# Patient Record
Sex: FEMALE | Race: WHITE | NOT HISPANIC OR LATINO | Employment: OTHER | URBAN - METROPOLITAN AREA
[De-identification: names, ages, dates, MRNs, and addresses within clinical notes are randomized per-mention and may not be internally consistent; named-entity substitution may affect disease eponyms.]

---

## 2017-02-01 ENCOUNTER — TRANSCRIBE ORDERS (OUTPATIENT)
Dept: ADMINISTRATIVE | Facility: HOSPITAL | Age: 47
End: 2017-02-01

## 2017-02-01 DIAGNOSIS — Z12.31 OTHER SCREENING MAMMOGRAM: Primary | ICD-10-CM

## 2017-03-01 ENCOUNTER — HOSPITAL ENCOUNTER (OUTPATIENT)
Dept: MAMMOGRAPHY | Facility: MEDICAL CENTER | Age: 47
Discharge: HOME | End: 2017-03-01
Payer: COMMERCIAL

## 2017-03-01 DIAGNOSIS — Z12.31 ENCOUNTER FOR SCREENING MAMMOGRAM FOR MALIGNANT NEOPLASM OF BREAST: ICD-10-CM

## 2017-03-01 PROCEDURE — G0202 SCR MAMMO BI INCL CAD: HCPCS

## 2017-04-21 ENCOUNTER — ALLSCRIPTS OFFICE VISIT (OUTPATIENT)
Dept: OTHER | Facility: OTHER | Age: 47
End: 2017-04-21

## 2017-04-21 DIAGNOSIS — N92.0 EXCESSIVE AND FREQUENT MENSTRUATION WITH REGULAR CYCLE: ICD-10-CM

## 2018-01-12 NOTE — RESULT NOTES
Verified Results  HOLTER MONITOR - 24 HOUR [id number] 11:20AM Archana Ramírez Order Number: [id number]     Test Name Result Flag Reference   HOLTER MONITOR - 24 HOUR (Report)     Indication: Palpitations     Patient was monitored for a total of 24 hours from 11:15 AM on 3/17/2016  A total of [id number] beats were monitored  The image quality was Good  The predominant rhythm was Normal sinus rhythm  Average heart rate was 81 bpm  Minimum heart rate was 57 bpm which was Sinus bradycardia at 4:42 PM  Maximum heart rate was 145 bpm which was Sinus tachycardia at 8:40 AM       There was a total of 4 minutes of bradycardia which was Sinus bradycardia  Ventricular ectopy consisted of 149 beats, 0 1 % of the total monitored beats  This consisted of Isolated PVCs  Supraventricular ectopy consisted of 11 beats, Less than 0 1 % of the total monitored beats  This consisted of Isolated PACs  There was no Atrial fibrillation during the monitored period  There were no other arrhythmias other than as described above  A symptom diary Was returned for correlation With no symptoms reported  Impression:     Overall Normal 24 hrs of holter monitoring  Predominant rhythm was Normal sinus rhythm  Normal diurnal variation of heart rate   Low-density of supraventricular ectopy during the monitored period  Low-density of ventricular ectopy during the monitored period      No symptoms were reported in the symptom diary

## 2018-01-15 VITALS
SYSTOLIC BLOOD PRESSURE: 126 MMHG | WEIGHT: 293 LBS | HEIGHT: 68 IN | BODY MASS INDEX: 44.41 KG/M2 | DIASTOLIC BLOOD PRESSURE: 78 MMHG

## 2018-01-17 NOTE — RESULT NOTES
Verified Results  * MAMMO SCREENING BILATERAL W CAD [id number] 12:33PM Kory De Leon     Test Name Result Flag Reference   MAMMO SCREENING BILATERAL W CAD (Report)     Patient History:   Patient had first child at age 43  Family history of unknown cancer in father and unknown cancer in    maternal grandmother  Patient has never smoked  Patient's BMI is 43 0  Reason for exam: screening (asymptomatic)  Mammo Screening Bilateral W CAD: February 6, 2016 - Check In #:    [de-identified]   Bilateral CC and MLO view(s) were taken  Technologist: MARICRUZ Shaffer (R)(M)   Prior study comparison: December 29, 2014, bilateral digital    screening mammogram performed at [place] [address]  January 16, 2014, right breast unilateral    diagnostic mammogram, performed at [address] [place] [address]  December 27, 2013, bilateral digital screening mammogram   performed at [place] [address]  The breast tissue is almost entirely fat  Bilateral digital mammography was performed  No dominant soft    tissue mass, architectural distortion or suspicious    calcifications are noted in either breast  The skin and nipple    contours are within normal limits  No significant changes when compared with prior studies  ASSESSMENT: BiRad:1 - Negative     Recommendation:   Routine screening mammogram of both breasts in 1 year  A    reminder letter will be scheduled  Analyzed by CAD     8-10% of cancers will be missed on mammography  Management of a    palpable abnormality must be based on clinical grounds  Patients   will be notified of their results via letter from our facility  Accredited by [employer] of [employer] and FDA       Transcription Location: MARICRUZ Calderon 98: [id number]     Risk Value(s):   Tyrer-Cuzick 10 Year: 2 857%, Tyrer-Cuzick Lifetime: 15 447%,    Myriad Table: 1 5%, LOUIE 5 Year: 1 1%, NCI Lifetime: 13 0%

## 2018-03-02 DIAGNOSIS — Z12.31 ENCOUNTER FOR SCREENING MAMMOGRAM FOR MALIGNANT NEOPLASM OF BREAST: ICD-10-CM

## 2018-03-09 ENCOUNTER — HOSPITAL ENCOUNTER (OUTPATIENT)
Dept: MAMMOGRAPHY | Facility: HOSPITAL | Age: 48
Discharge: HOME | End: 2018-03-09
Payer: COMMERCIAL

## 2018-03-09 DIAGNOSIS — Z12.31 ENCOUNTER FOR SCREENING MAMMOGRAM FOR MALIGNANT NEOPLASM OF BREAST: ICD-10-CM

## 2018-03-09 PROCEDURE — 77067 SCR MAMMO BI INCL CAD: CPT

## 2018-06-12 PROBLEM — D64.9 ANEMIA: Status: ACTIVE | Noted: 2018-06-12

## 2018-06-12 PROBLEM — E66.01 MORBID OBESITY (HCC): Status: ACTIVE | Noted: 2018-06-12

## 2018-06-12 PROBLEM — E53.8 VITAMIN B 12 DEFICIENCY: Status: ACTIVE | Noted: 2018-06-12

## 2018-07-18 ENCOUNTER — ANNUAL EXAM (OUTPATIENT)
Dept: OBGYN CLINIC | Facility: CLINIC | Age: 48
End: 2018-07-18
Payer: COMMERCIAL

## 2018-07-18 VITALS
DIASTOLIC BLOOD PRESSURE: 78 MMHG | SYSTOLIC BLOOD PRESSURE: 122 MMHG | BODY MASS INDEX: 43.4 KG/M2 | WEIGHT: 293 LBS | HEIGHT: 69 IN

## 2018-07-18 DIAGNOSIS — Z12.31 ENCOUNTER FOR SCREENING MAMMOGRAM FOR MALIGNANT NEOPLASM OF BREAST: ICD-10-CM

## 2018-07-18 DIAGNOSIS — Z01.419 ENCNTR FOR GYN EXAM (GENERAL) (ROUTINE) W/O ABN FINDINGS: ICD-10-CM

## 2018-07-18 PROCEDURE — S0612 ANNUAL GYNECOLOGICAL EXAMINA: HCPCS | Performed by: PHYSICIAN ASSISTANT

## 2018-07-18 RX ORDER — DOXYCYCLINE HYCLATE 50 MG/1
324 CAPSULE, GELATIN COATED ORAL
COMMUNITY

## 2018-07-18 NOTE — PROGRESS NOTES
Lillian Noe  1970      CC:  Yearly exam    S:  52 y o  female here for yearly exam  Her cycles are spacing out, coming occasionally every 3 weeks but mostly every 6-8 weeks  They are sometimes just a day of light bleeding and sometimes are a regular period; her regular periods can be heavy and she uses ibuprofen with good results for the heaviness  She is aware to call with any heavier bleeding or if her periods are coming closer together  She is sexually active  She uses condoms for contraception  She continues to work as an  in [employer] and is happy in her practice  She has started some very rare, intermittent hot flashes  She will call if this worsens or becomes more problematic  Last Pap 3/9/16 neg/neg  Last Mammo 3/9/18 neg      Current Outpatient Prescriptions:     cyanocobalamin 1,000 mcg/mL, Inject 1,000 mcg into the shoulder, thigh, or buttocks every 30 (thirty) days, Disp: , Rfl:     diphenhydrAMINE-acetaminophen (TYLENOL PM)  MG TABS, Take 1 tablet by mouth daily at bedtime as needed for sleep, Disp: , Rfl:     ergocalciferol (VITAMIN D2) 50,000 units, Take 50,000 Units by mouth once a week, Disp: , Rfl:     ferrous gluconate (FERGON) 324 mg tablet, Take 324 mg by mouth daily with breakfast, Disp: , Rfl:   Social History     Social History    Marital status: /Civil Union     Spouse name: N/A    Number of children: N/A    Years of education: N/A     Occupational History    Not on file       Social History Main Topics    Smoking status: Never Smoker    Smokeless tobacco: Never Used    Alcohol use No    Drug use: No    Sexual activity: Yes     Partners: Male     Other Topics Concern    Not on file     Social History Narrative    No narrative on file     Family History   Problem Relation Age of Onset    Hypertension Mother     Fibroids Mother     Leukemia Father       Past Medical History:   Diagnosis Date    Anemia     Morbid obesity ([facility] [address] )     Vitamin B 12 deficiency         O:  Blood pressure 122/78, height 5' 9" (1 753 m), weight (!) 151 kg (332 lb), last menstrual period 06/18/2018  Patient appears well and is not in distress  Neck is supple without masses  Breasts are symmetrical without mass, tenderness, nipple discharge, skin changes or adenopathy  Abdomen is soft and nontender without masses  External genitals are normal without lesions or rashes  Vagina is normal without discharge or bleeding  Cervix is normal without discharge or lesion  Uterus is normal, mobile, nontender without palpable mass  Adnexa are normal, nontender, without palpable mass  A:  Yearly exam      P:   Pap due 2021   Mammo slip provided    RTO one year for yearly exam or sooner as needed

## 2018-09-10 ENCOUNTER — TELEPHONE (OUTPATIENT)
Dept: OBGYN CLINIC | Facility: CLINIC | Age: 48
End: 2018-09-10

## 2018-09-10 NOTE — TELEPHONE ENCOUNTER
Patient had spoken to you previously about her periods and stated that if she continues to have heavy periods to give us a call and you could try some different things  Patient stated period is currently very heavy and it kept her awake last night

## 2018-09-11 NOTE — TELEPHONE ENCOUNTER
If she is having heavier bleeding that ibuprofen is not controlling, she needs a workup to see why her bleeding is so heavy  At this point I have no way of proving that there is nothing wrong causing her heavy bleeding  I am okay with not repeating labs - I didn't see that she already had them - but we need to be sure her uterus is structurally normal before proceeding with treatment (by checking the ultrasound)  If she refuses, she can come in and I can discuss this further with her - she will also need an endometrial biopsy at that time

## 2018-09-11 NOTE — TELEPHONE ENCOUNTER
Spoke with pt - she stated she had those b/w done for her PCP last month and will have those results sent to us - does not want to go for them again  As for the u/s - she refuses to do it since she has a high deductible  She stated she was under the impression from her last appointment that she would be given a medication if this happened and would like an RX  Please advise  Thanks!

## 2018-09-11 NOTE — TELEPHONE ENCOUNTER
I spoke with patient  "if she would have known we were going to order invasive, expensive tests she would have not called our office " She said that an [place] would be financially excessive to do " I did explain that we need to evaluate why she is having heavy bleeding  She is a self employed  and has a $9,000 deductible  She thought we would just give her  medicine to stop the bleeding  I again explained that we need to evaluate further by US or office vist with EB  She is not interested in doing this at this time and said "she will just deal with it " I told her I would really this message to you

## 2019-03-21 ENCOUNTER — HOSPITAL ENCOUNTER (OUTPATIENT)
Dept: MAMMOGRAPHY | Facility: HOSPITAL | Age: 49
Discharge: HOME | End: 2019-03-21
Payer: COMMERCIAL

## 2019-03-21 VITALS — WEIGHT: 293 LBS | BODY MASS INDEX: 43.4 KG/M2 | HEIGHT: 69 IN

## 2019-03-21 DIAGNOSIS — Z12.31 ENCOUNTER FOR SCREENING MAMMOGRAM FOR MALIGNANT NEOPLASM OF BREAST: ICD-10-CM

## 2019-03-21 PROCEDURE — 77067 SCR MAMMO BI INCL CAD: CPT

## 2019-09-24 ENCOUNTER — ANNUAL EXAM (OUTPATIENT)
Dept: OBGYN CLINIC | Facility: CLINIC | Age: 49
End: 2019-09-24
Payer: COMMERCIAL

## 2019-09-24 VITALS
WEIGHT: 293 LBS | DIASTOLIC BLOOD PRESSURE: 80 MMHG | BODY MASS INDEX: 44.41 KG/M2 | SYSTOLIC BLOOD PRESSURE: 126 MMHG | HEIGHT: 68 IN

## 2019-09-24 DIAGNOSIS — Z01.419 ENCOUNTER FOR GYNECOLOGICAL EXAMINATION WITHOUT ABNORMAL FINDING: Primary | ICD-10-CM

## 2019-09-24 DIAGNOSIS — Z12.31 ENCOUNTER FOR SCREENING MAMMOGRAM FOR MALIGNANT NEOPLASM OF BREAST: ICD-10-CM

## 2019-09-24 PROBLEM — D50.9 IRON DEFICIENCY ANEMIA: Status: ACTIVE | Noted: 2017-05-31

## 2019-09-24 PROCEDURE — S0612 ANNUAL GYNECOLOGICAL EXAMINA: HCPCS | Performed by: PHYSICIAN ASSISTANT

## 2019-09-24 NOTE — PROGRESS NOTES
Bronwyn Lubin  1970      CC:  Yearly exam    S:  50 y o  female here for yearly exam  Her cycles are spacing out and she has currently gone 6 months without a period  She does have some hot flashes and night sweats  She is in the process of adopting a 11year old son from [facility]  Sexual activity: She is sexually active without pain, bleeding or dryness  Contraception: She uses condoms for contraception  Last Pap 3/9/16 neg/neg  Last Mammo 3/21/19 neg  Last colonoscopy 6/2019    We reviewed [facility] guidelines for Pap testing today       Family hx of breast cancer: no  Family hx of ovarian cancer: no  Family hx of colon cancer: no    Current Outpatient Medications:     cyanocobalamin 1,000 mcg/mL, Inject 1,000 mcg into the shoulder, thigh, or buttocks every 30 (thirty) days, Disp: , Rfl:     diphenhydrAMINE-acetaminophen (TYLENOL PM)  MG TABS, Take 1 tablet by mouth daily at bedtime as needed for sleep, Disp: , Rfl:     ergocalciferol (VITAMIN D2) 50,000 units, Take 50,000 Units by mouth once a week, Disp: , Rfl:     ferrous gluconate (FERGON) 324 mg tablet, Take 324 mg by mouth daily with breakfast, Disp: , Rfl:   Social History     Socioeconomic History    Marital status: /Civil Union     Spouse name: Not on file    Number of children: Not on file    Years of education: Not on file    Highest education level: Not on file   Occupational History    Not on file   Social Needs    Financial resource strain: Not on file    Food insecurity:     Worry: Not on file     Inability: Not on file    Transportation needs:     Medical: Not on file     Non-medical: Not on file   Tobacco Use    Smoking status: Never Smoker    Smokeless tobacco: Never Used   Substance and Sexual Activity    Alcohol use: No    Drug use: No    Sexual activity: Yes     Partners: Male   Lifestyle    Physical activity:     Days per week: Not on file     Minutes per session: Not on file    Stress: Not on file   Relationships    Social connections:     Talks on phone: Not on file     Gets together: Not on file     Attends Anabaptist service: Not on file     Active member of club or organization: Not on file     Attends meetings of clubs or organizations: Not on file     Relationship status: Not on file    Intimate partner violence:     Fear of current or ex partner: Not on file     Emotionally abused: Not on file     Physically abused: Not on file     Forced sexual activity: Not on file   Other Topics Concern    Not on file   Social History Narrative    Not on file     Family History   Problem Relation Age of Onset    Hypertension Mother    Romulo Macias Fibroids Mother     Leukemia Father     Thyroid cancer Maternal Grandmother       Past Medical History:   Diagnosis Date    Anemia     Morbid obesity ([facility] [address] [facility]     Vitamin B 12 deficiency         Review of Systems   Respiratory: Negative  Cardiovascular: Negative  Gastrointestinal: Negative for constipation and diarrhea  Genitourinary: Negative for difficulty urinating, pelvic pain, vaginal bleeding, vaginal discharge, itching or odor  O:  Blood pressure 126/80, height 5' 7 72" (1 72 m), weight (!) 166 kg (367 lb)  Patient appears well and is not in distress  Neck is supple without masses  Breasts are symmetrical without mass, tenderness, nipple discharge, skin changes or adenopathy  Abdomen is soft and nontender without masses  External genitals are normal without lesions or rashes  Urethral meatus and urethra are normal  Bladder is normal to palpation  Vagina is normal without discharge or bleeding  Cervix is normal without discharge or lesion  Uterus is normal, mobile, nontender without palpable mass  Adnexa are normal, nontender, without palpable mass  A:  Yearly exam      P:   Pap 2021   Mammo slip provided    RTO one year for yearly exam or sooner as needed

## 2019-11-25 NOTE — PRE-PROCEDURE INSTRUCTIONS
Pre-Surgery Instructions:   Medication Instructions    CALCIUM PO Instructed patient per Anesthesia Guidelines   cyanocobalamin 1,000 mcg/mL Instructed patient per Anesthesia Guidelines   ergocalciferol (VITAMIN D2) 50,000 units Instructed patient per Anesthesia Guidelines   ferrous gluconate (FERGON) 324 mg tablet Instructed patient per Anesthesia Guidelines  Pt believes md ordered oral antibiotic for DOS- if so will take DOS with sip ofw ater per md instructions

## 2019-11-26 ENCOUNTER — ANESTHESIA EVENT (OUTPATIENT)
Dept: PERIOP | Facility: HOSPITAL | Age: 49
End: 2019-11-26
Payer: COMMERCIAL

## 2019-11-27 ENCOUNTER — ANESTHESIA (OUTPATIENT)
Dept: PERIOP | Facility: HOSPITAL | Age: 49
End: 2019-11-27
Payer: COMMERCIAL

## 2019-11-27 ENCOUNTER — HOSPITAL ENCOUNTER (OUTPATIENT)
Facility: HOSPITAL | Age: 49
Setting detail: OUTPATIENT SURGERY
Discharge: HOME | End: 2019-11-27
Attending: SURGERY | Admitting: SURGERY
Payer: COMMERCIAL

## 2019-11-27 VITALS
TEMPERATURE: 99.7 F | HEIGHT: 70 IN | RESPIRATION RATE: 22 BRPM | SYSTOLIC BLOOD PRESSURE: 110 MMHG | DIASTOLIC BLOOD PRESSURE: 63 MMHG | WEIGHT: 293 LBS | OXYGEN SATURATION: 97 % | HEART RATE: 58 BPM | BODY MASS INDEX: 41.95 KG/M2

## 2019-11-27 DIAGNOSIS — C43.39 MALIGNANT MELANOMA OF OTHER PARTS OF FACE (HCC): ICD-10-CM

## 2019-11-27 PROBLEM — D03.39 MELANOMA IN SITU OF CHEEK (HCC): Status: ACTIVE | Noted: 2019-11-27

## 2019-11-27 LAB
EXT PREGNANCY TEST URINE: NEGATIVE
EXT. CONTROL: NORMAL

## 2019-11-27 PROCEDURE — 88305 TISSUE EXAM BY PATHOLOGIST: CPT | Performed by: PATHOLOGY

## 2019-11-27 PROCEDURE — 81025 URINE PREGNANCY TEST: CPT | Performed by: ANESTHESIOLOGY

## 2019-11-27 RX ORDER — FENTANYL CITRATE 50 UG/ML
INJECTION, SOLUTION INTRAMUSCULAR; INTRAVENOUS AS NEEDED
Status: DISCONTINUED | OUTPATIENT
Start: 2019-11-27 | End: 2019-11-27 | Stop reason: SURG

## 2019-11-27 RX ORDER — DEXAMETHASONE SODIUM PHOSPHATE 4 MG/ML
INJECTION, SOLUTION INTRA-ARTICULAR; INTRALESIONAL; INTRAMUSCULAR; INTRAVENOUS; SOFT TISSUE AS NEEDED
Status: DISCONTINUED | OUTPATIENT
Start: 2019-11-27 | End: 2019-11-27 | Stop reason: SURG

## 2019-11-27 RX ORDER — MIDAZOLAM HYDROCHLORIDE 2 MG/2ML
INJECTION, SOLUTION INTRAMUSCULAR; INTRAVENOUS AS NEEDED
Status: DISCONTINUED | OUTPATIENT
Start: 2019-11-27 | End: 2019-11-27 | Stop reason: SURG

## 2019-11-27 RX ORDER — PROPOFOL 10 MG/ML
INJECTION, EMULSION INTRAVENOUS AS NEEDED
Status: DISCONTINUED | OUTPATIENT
Start: 2019-11-27 | End: 2019-11-27 | Stop reason: SURG

## 2019-11-27 RX ORDER — CEFAZOLIN SODIUM 1 G/50ML
1000 SOLUTION INTRAVENOUS ONCE
Status: DISCONTINUED | OUTPATIENT
Start: 2019-11-27 | End: 2019-11-27

## 2019-11-27 RX ORDER — NEOSTIGMINE METHYLSULFATE 1 MG/ML
INJECTION INTRAVENOUS AS NEEDED
Status: DISCONTINUED | OUTPATIENT
Start: 2019-11-27 | End: 2019-11-27 | Stop reason: SURG

## 2019-11-27 RX ORDER — LIDOCAINE HYDROCHLORIDE AND EPINEPHRINE 10; 10 MG/ML; UG/ML
INJECTION, SOLUTION INFILTRATION; PERINEURAL AS NEEDED
Status: DISCONTINUED | OUTPATIENT
Start: 2019-11-27 | End: 2019-11-27 | Stop reason: HOSPADM

## 2019-11-27 RX ORDER — GLYCOPYRROLATE 0.2 MG/ML
INJECTION INTRAMUSCULAR; INTRAVENOUS AS NEEDED
Status: DISCONTINUED | OUTPATIENT
Start: 2019-11-27 | End: 2019-11-27 | Stop reason: SURG

## 2019-11-27 RX ORDER — MEPERIDINE HYDROCHLORIDE 50 MG/ML
12.5 INJECTION INTRAMUSCULAR; INTRAVENOUS; SUBCUTANEOUS ONCE AS NEEDED
Status: DISCONTINUED | OUTPATIENT
Start: 2019-11-27 | End: 2019-11-27 | Stop reason: HOSPADM

## 2019-11-27 RX ORDER — GINSENG 100 MG
CAPSULE ORAL AS NEEDED
Status: DISCONTINUED | OUTPATIENT
Start: 2019-11-27 | End: 2019-11-27 | Stop reason: HOSPADM

## 2019-11-27 RX ORDER — HYDROCODONE BITARTRATE AND ACETAMINOPHEN 5; 325 MG/1; MG/1
1 TABLET ORAL EVERY 4 HOURS PRN
Status: DISCONTINUED | OUTPATIENT
Start: 2019-11-27 | End: 2019-11-27 | Stop reason: HOSPADM

## 2019-11-27 RX ORDER — HYDROMORPHONE HCL/PF 1 MG/ML
0.5 SYRINGE (ML) INJECTION
Status: DISCONTINUED | OUTPATIENT
Start: 2019-11-27 | End: 2019-11-27 | Stop reason: HOSPADM

## 2019-11-27 RX ORDER — ONDANSETRON 2 MG/ML
INJECTION INTRAMUSCULAR; INTRAVENOUS AS NEEDED
Status: DISCONTINUED | OUTPATIENT
Start: 2019-11-27 | End: 2019-11-27 | Stop reason: SURG

## 2019-11-27 RX ORDER — ROCURONIUM BROMIDE 10 MG/ML
INJECTION, SOLUTION INTRAVENOUS AS NEEDED
Status: DISCONTINUED | OUTPATIENT
Start: 2019-11-27 | End: 2019-11-27 | Stop reason: SURG

## 2019-11-27 RX ORDER — SODIUM CHLORIDE 9 MG/ML
125 INJECTION, SOLUTION INTRAVENOUS CONTINUOUS
Status: DISCONTINUED | OUTPATIENT
Start: 2019-11-27 | End: 2019-11-27 | Stop reason: HOSPADM

## 2019-11-27 RX ORDER — ONDANSETRON 2 MG/ML
4 INJECTION INTRAMUSCULAR; INTRAVENOUS ONCE AS NEEDED
Status: DISCONTINUED | OUTPATIENT
Start: 2019-11-27 | End: 2019-11-27 | Stop reason: HOSPADM

## 2019-11-27 RX ORDER — FENTANYL CITRATE/PF 50 MCG/ML
50 SYRINGE (ML) INJECTION
Status: DISCONTINUED | OUTPATIENT
Start: 2019-11-27 | End: 2019-11-27 | Stop reason: HOSPADM

## 2019-11-27 RX ADMIN — HYDROCODONE BITARTRATE AND ACETAMINOPHEN 1 TABLET: 5; 325 TABLET ORAL at 11:13

## 2019-11-27 RX ADMIN — NEOSTIGMINE METHYLSULFATE 3 MG: 1 INJECTION, SOLUTION INTRAVENOUS at 09:24

## 2019-11-27 RX ADMIN — PROPOFOL 200 MG: 10 INJECTION, EMULSION INTRAVENOUS at 08:42

## 2019-11-27 RX ADMIN — LIDOCAINE HYDROCHLORIDE 100 MG: 20 INJECTION, SOLUTION INTRAVENOUS at 09:20

## 2019-11-27 RX ADMIN — Medication 3000 MG: at 08:20

## 2019-11-27 RX ADMIN — SODIUM CHLORIDE: 0.9 INJECTION, SOLUTION INTRAVENOUS at 09:19

## 2019-11-27 RX ADMIN — GLYCOPYRROLATE 0.5 MG: 0.2 INJECTION INTRAMUSCULAR; INTRAVENOUS at 09:24

## 2019-11-27 RX ADMIN — FENTANYL CITRATE 100 MCG: 50 INJECTION, SOLUTION INTRAMUSCULAR; INTRAVENOUS at 08:40

## 2019-11-27 RX ADMIN — ONDANSETRON 4 MG: 2 INJECTION INTRAMUSCULAR; INTRAVENOUS at 09:00

## 2019-11-27 RX ADMIN — LIDOCAINE HYDROCHLORIDE 100 MG: 20 INJECTION, SOLUTION INTRAVENOUS at 08:42

## 2019-11-27 RX ADMIN — DEXAMETHASONE SODIUM PHOSPHATE 6 MG: 4 INJECTION, SOLUTION INTRAMUSCULAR; INTRAVENOUS at 09:00

## 2019-11-27 RX ADMIN — MIDAZOLAM 2 MG: 1 INJECTION INTRAMUSCULAR; INTRAVENOUS at 08:36

## 2019-11-27 RX ADMIN — ROCURONIUM BROMIDE 30 MG: 50 INJECTION, SOLUTION INTRAVENOUS at 08:42

## 2019-11-27 RX ADMIN — PROPOFOL 100 MG: 10 INJECTION, EMULSION INTRAVENOUS at 09:20

## 2019-11-27 RX ADMIN — SODIUM CHLORIDE 125 ML/HR: 0.9 INJECTION, SOLUTION INTRAVENOUS at 08:10

## 2019-11-27 RX ADMIN — PROPOFOL 30 MG: 10 INJECTION, EMULSION INTRAVENOUS at 09:03

## 2019-11-27 NOTE — DISCHARGE INSTRUCTIONS
[facility]  Postoperative Instructions for Outpatient Surgery  [facility], [address] [place] [address], [address] [place] [address] Rd, [place], [address] [facility] Keisha / Сергей Roche  / www [facility]      These  instructions are being provided by you doctor to give you basic guidelines during you post-op recovery  Please let our office know your contact information has changed  Please call the office today to schedule a post operative appointment, and tell the office staff  that you doctor needs see you in our office in 7-10 days  Dressing:          No dressing required    Apply ice as needed       Bathing      Showering permitted          Apply Bacitracin, Neosporin, other antibiotic ointment  To incision 4-5 x/day     Medication    Resume preparative medication       Motrin or Tylenol is OK    Other Instruction:  Wash area daily      Activities  No bending , lifting, or straining        Bruising, and welling I expected  incision and surrounding area  It is normal to have a slight fever after surgery  If the fever I above 101 5 please call our office  If you have a drain, leaking around the drain it may occur  The normal  Occasionally, a drain may clog  If this happens carefully remove the bulb and try milking the obstruction  out of the tubing  Garments after liposuction will become soiled  You should protect area where you will sitting or lying  The majority of the drainage should subside within 48 hrs  Do Not remove garment unless otherwise instructed  A side effect of the pain medication is constipation  If this dose happen your doctor recommends that you take Senokot, Colace or something over the counter for this  Do not hesitate to call the office at [phone] if you have any questions about your surgery  The nursing staff will be glad to assist you in any possible way   If it is necessary for you to contract a doctor when the office is closed or on the weekend, please call [phone] and it will direct you to the answering service  A physician will contact you to assist you with any problems or questions

## 2019-11-27 NOTE — OP NOTE
OPERATIVE REPORT  PATIENT NAME: Jovita Kraus    :  1970  MRN: [id number]  Pt Location: [facility] OR ROOM 05    SURGERY DATE: 2019    Surgeon(s) and Role:     * Beatrice Whipple MD - Primary     * Delories Grief - Assisting    Preop Diagnosis:  Malignant melanoma of other parts of face ([facility] [address] ) [C43 39]    Post-Op Diagnosis Codes:     * Malignant melanoma of other parts of face ([facility] [address] ) [C43 39]    Procedure(s) (LRB):  CHEEK MELANOMA EXCISION WITH RECONSTRUCTION (Left)   Excision melanoma left cheek 4 1 x 2 cm  Left cervical facial fasciocutaneous flap reconstruction    Specimen(s):  ID Type Source Tests Collected by Time Destination   1 : Melanoma left cheek - suture = 1200 Tissue Lesion TISSUE EXAM Beatrice Whipple MD 2019 [id number]        Estimated Blood Loss:   5 mL    Drains:  * No LDAs found *    Anesthesia Type:   Choice    Operative Indications:  Malignant melanoma of other parts of face ([facility] [address] ) [C43 39]       Operative Findings:  none    Complications:   None    Procedure and Technique:  Patient identified correctly on the table  Intubated by anesthesia  Prepped and draped in sterile surgical fashion  The melanoma on the cheek with marked out with 1 cm margins  The areas anesthetized with 1% lidocaine with epinephrine  A left infra orbital block was performed with 1% lidocaine with epinephrine  The lesion was excised with a 15 blade all the way down to the SMAS layer  The lesion was tagged at the 12 o'clock position  This was sent off to pathology  At this point we elevated a left lateral cervical facial fasciocutaneous flap  The SMAS was elevated up off of the underlying left face  The entire skin and SMAS was rotated into the defect  This mass was closed with 3-0 PDS  Deep dermis of 4-0 PDS  Interrupted 6 0 Prolene sutures  Patient was dressed with antibiotic ointment awakened from surgery taken recovery in stable condition  All counts correct x2         I was present for the entire procedure    Patient Disposition:  PACU     SIGNATURE: Devin Lebron MD  DATE: November 27, 2019  TIME: 9:46 AM

## 2019-11-27 NOTE — ANESTHESIA PREPROCEDURE EVALUATION
Review of Systems/Medical History  Patient summary reviewed  Chart reviewed      Cardiovascular  Negative cardio ROS DVT   Pulmonary  Negative pulmonary ROS        GI/Hepatic    Bariatric surgery (2003 LRYGB),        Negative  ROS        Endo/Other    Comment: melanoma Obesity  morbid obesity   GYN  Negative gynecology ROS          Hematology  Anemia iron deficiency anemia,     Musculoskeletal  Negative musculoskeletal ROS        Neurology  Negative neurology ROS      Psychology   Negative psychology ROS              Physical Exam    Airway    Mallampati score: II  TM Distance: >3 FB  Neck ROM: full     Dental       Cardiovascular  Comment: Negative ROS, Rhythm: regular, Rate: normal, Cardiovascular exam normal    Pulmonary  Pulmonary exam normal Breath sounds clear to auscultation,     Other Findings        Anesthesia Plan  ASA Score- 3     Anesthesia Type- general with ASA Monitors  Additional Monitors:   Airway Plan:     Comment: Local/TIVA  Plan Factors-Patient not instructed to abstain from smoking on day of procedure  Patient did not smoke on day of surgery  Induction- intravenous  Postoperative Plan-     Informed Consent- Anesthetic plan and risks discussed with patient

## 2019-11-27 NOTE — DISCHARGE SUMMARY
PLASTIC, RECONSTRUCTIVE, & HAND SURGERY   Discharge Summary  Date of Admission:   11/27/2019  Date of Discharge:   11/27/19  Attending:  Beatrice Whipple MD  Principal/Final Diagnosis:   Malignant melanoma of other parts of face [facility]) [C43 39]  Principal Procedure:   CHEEK MELANOMA EXCISION WITH RECONSTRUCTION (Left Face)  Discharge Medications:  See after visit summary for reconciled discharge medications provided to patient and family  Reason for Admission:  Jovita Kraus was electively admitted to undergo the above named procedure on 11/27/2019 as an outpatient  Hospital Course:  Patient underwent the above named procedure on the day of admission without complications  They were discharged home the same day  Disposition:  To home in care of self and family    Condition:  Good  Follow up:  Patient with follow up in the office with Dr Beatrice Whipple MD in 1 week(s) or as scheduled per his office  Beatrice Whipple MD  11/27/2019 9:44 AM

## 2019-11-27 NOTE — INTERVAL H&P NOTE
H&P reviewed  After examining the patient I find no changes in the patients condition since the H&P had been written      Vitals:    11/27/19 0736   BP: 135/89   Pulse: 67   Resp: (!) 63   Temp: 98 6 °F (37 °C)   SpO2: 98%

## 2020-09-29 ENCOUNTER — ANNUAL EXAM (OUTPATIENT)
Dept: OBGYN CLINIC | Facility: CLINIC | Age: 50
End: 2020-09-29
Payer: COMMERCIAL

## 2020-09-29 VITALS
TEMPERATURE: 98.7 F | SYSTOLIC BLOOD PRESSURE: 118 MMHG | BODY MASS INDEX: 52.52 KG/M2 | WEIGHT: 293 LBS | DIASTOLIC BLOOD PRESSURE: 78 MMHG

## 2020-09-29 DIAGNOSIS — Z01.419 ENCNTR FOR GYN EXAM (GENERAL) (ROUTINE) W/O ABN FINDINGS: ICD-10-CM

## 2020-09-29 DIAGNOSIS — Z12.31 ENCOUNTER FOR SCREENING MAMMOGRAM FOR MALIGNANT NEOPLASM OF BREAST: ICD-10-CM

## 2020-09-29 PROCEDURE — 87624 HPV HI-RISK TYP POOLED RSLT: CPT | Performed by: PHYSICIAN ASSISTANT

## 2020-09-29 PROCEDURE — S0612 ANNUAL GYNECOLOGICAL EXAMINA: HCPCS | Performed by: PHYSICIAN ASSISTANT

## 2020-09-29 PROCEDURE — G0145 SCR C/V CYTO,THINLAYER,RESCR: HCPCS | Performed by: PHYSICIAN ASSISTANT

## 2020-09-29 NOTE — PROGRESS NOTES
Mundo Peterson  1970      CC:  Yearly exam    S:  52 y o  female here for yearly exam  Her cycles are spacing out; she had a heavy cycle six months ago, then nothing until some spotting yesterday which has since resolved  Sexual activity: She is sexually active without pain, bleeding or dryness  Contraception: She uses condoms for contraception  They are trying to adopt a baby from [facility] but everything is slow right now because of COVID  Last Pap 3/9/16 neg/neg  Last Mammo 3/21/19 neg  Last Colonoscopy 6/2019 neg    We reviewed [facility] guidelines for Pap testing today       Family hx of breast cancer: no  Family hx of ovarian cancer: no  Family hx of colon cancer: no      Current Outpatient Medications:     CALCIUM PO, Take by mouth daily, Disp: , Rfl:     CEPHALEXIN PO, Take by mouth Took am of surgery 3 tablets daily, Disp: , Rfl:     cyanocobalamin 1,000 mcg/mL, Inject 1,000 mcg into the shoulder, thigh, or buttocks every 30 (thirty) days, Disp: , Rfl:     ergocalciferol (VITAMIN D2) 50,000 units, Take 50,000 Units by mouth once a week, Disp: , Rfl:     ferrous gluconate (FERGON) 324 mg tablet, Take 324 mg by mouth daily with breakfast, Disp: , Rfl:   Social History     Socioeconomic History    Marital status: /Civil Union     Spouse name: Not on file    Number of children: Not on file    Years of education: Not on file    Highest education level: Not on file   Occupational History    Not on file   Social Needs    Financial resource strain: Not on file    Food insecurity     Worry: Not on file     Inability: Not on file   [employer] needs     Medical: Not on file     Non-medical: Not on file   Tobacco Use    Smoking status: Never Smoker    Smokeless tobacco: Never Used   Substance and Sexual Activity    Alcohol use: No    Drug use: No    Sexual activity: Yes     Partners: Male   Lifestyle    Physical activity     Days per week: Not on file     Minutes per session: Not on file    Stress: Not on file   Relationships    Social connections     Talks on phone: Not on file     Gets together: Not on file     Attends Rastafari service: Not on file     Active member of club or organization: Not on file     Attends meetings of clubs or organizations: Not on file     Relationship status: Not on file    Intimate partner violence     Fear of current or ex partner: Not on file     Emotionally abused: Not on file     Physically abused: Not on file     Forced sexual activity: Not on file   Other Topics Concern    Not on file   Social History Narrative    Not on file     Family History   Problem Relation Age of Onset    Hypertension Mother     Fibroids Mother     Leukemia Father     Thyroid cancer Maternal Grandmother       Past Medical History:   Diagnosis Date    History of DVT in adulthood     RLE    Hx of gastric bypass     2003    Iron deficiency anemia     Melanoma ([facility] [address] [facility]     left cheek    Morbid obesity ([facility] [address] )     Vitamin B 12 deficiency     Wears contact lenses         Review of Systems   Respiratory: Negative  Cardiovascular: Negative  Gastrointestinal: Negative for constipation and diarrhea  Genitourinary: Negative for difficulty urinating, pelvic pain, vaginal bleeding, vaginal discharge, itching or odor  O:  Blood pressure 118/78, temperature 98 7 °F (37 1 °C), temperature source Tympanic, weight (!) 166 kg (366 lb), last menstrual period 09/28/2020, not currently breastfeeding  Patient appears well and is not in distress  Neck is supple without masses  Breasts are symmetrical without mass, tenderness, nipple discharge, skin changes or adenopathy  Abdomen is soft and nontender without masses  External genitals are normal without lesions or rashes  Urethral meatus and urethra are normal  Bladder is normal to palpation  Vagina is normal without discharge or bleeding  Cervix is normal without discharge or lesion     Uterus is normal, mobile, nontender without palpable mass  Adnexa are normal, nontender, without palpable mass  A:  Yearly exam      P:   Pap and HPV today      Mammo slip provided    Colonoscopy up to date    RTO one year for yearly exam or sooner as needed

## 2020-10-05 LAB
HPV HR 12 DNA CVX QL NAA+PROBE: NEGATIVE
HPV16 DNA CVX QL NAA+PROBE: NEGATIVE
HPV18 DNA CVX QL NAA+PROBE: NEGATIVE

## 2020-10-06 LAB
LAB AP GYN PRIMARY INTERPRETATION: NORMAL
Lab: NORMAL

## 2021-02-03 ENCOUNTER — HOSPITAL ENCOUNTER (OUTPATIENT)
Dept: MAMMOGRAPHY | Facility: MEDICAL CENTER | Age: 51
Discharge: HOME | End: 2021-02-03
Payer: COMMERCIAL

## 2021-02-03 VITALS — WEIGHT: 293 LBS | HEIGHT: 70 IN | BODY MASS INDEX: 41.95 KG/M2

## 2021-02-03 DIAGNOSIS — Z12.31 ENCOUNTER FOR SCREENING MAMMOGRAM FOR MALIGNANT NEOPLASM OF BREAST: ICD-10-CM

## 2021-02-03 PROCEDURE — 77067 SCR MAMMO BI INCL CAD: CPT

## 2021-02-03 PROCEDURE — 77063 BREAST TOMOSYNTHESIS BI: CPT

## 2021-03-10 DIAGNOSIS — Z23 ENCOUNTER FOR IMMUNIZATION: ICD-10-CM

## 2021-10-01 ENCOUNTER — ANNUAL EXAM (OUTPATIENT)
Dept: OBGYN CLINIC | Facility: CLINIC | Age: 51
End: 2021-10-01
Payer: COMMERCIAL

## 2021-10-01 VITALS
SYSTOLIC BLOOD PRESSURE: 128 MMHG | DIASTOLIC BLOOD PRESSURE: 82 MMHG | WEIGHT: 293 LBS | BODY MASS INDEX: 45.99 KG/M2 | HEIGHT: 67 IN

## 2021-10-01 DIAGNOSIS — Z01.419 ENCNTR FOR GYN EXAM (GENERAL) (ROUTINE) W/O ABN FINDINGS: ICD-10-CM

## 2021-10-01 DIAGNOSIS — Z12.31 ENCOUNTER FOR SCREENING MAMMOGRAM FOR MALIGNANT NEOPLASM OF BREAST: ICD-10-CM

## 2021-10-01 PROBLEM — E21.3 HYPERPARATHYROIDISM (HCC): Status: ACTIVE | Noted: 2020-12-28

## 2021-10-01 PROBLEM — E55.9 VITAMIN D DEFICIENCY: Status: ACTIVE | Noted: 2020-12-28

## 2021-10-01 PROCEDURE — S0612 ANNUAL GYNECOLOGICAL EXAMINA: HCPCS | Performed by: PHYSICIAN ASSISTANT

## 2022-04-16 ENCOUNTER — HOSPITAL ENCOUNTER (OUTPATIENT)
Dept: MAMMOGRAPHY | Facility: HOSPITAL | Age: 52
Discharge: HOME | End: 2022-04-16
Payer: COMMERCIAL

## 2022-04-16 DIAGNOSIS — Z12.31 ENCOUNTER FOR SCREENING MAMMOGRAM FOR MALIGNANT NEOPLASM OF BREAST: ICD-10-CM

## 2022-04-16 PROCEDURE — 77063 BREAST TOMOSYNTHESIS BI: CPT

## 2022-04-16 PROCEDURE — 77067 SCR MAMMO BI INCL CAD: CPT

## 2022-07-28 ENCOUNTER — RA CDI HCC (OUTPATIENT)
Dept: OTHER | Facility: HOSPITAL | Age: 52
End: 2022-07-28

## 2022-08-11 ENCOUNTER — OFFICE VISIT (OUTPATIENT)
Dept: FAMILY MEDICINE CLINIC | Facility: OTHER | Age: 52
End: 2022-08-11
Payer: COMMERCIAL

## 2022-08-11 VITALS
TEMPERATURE: 98 F | RESPIRATION RATE: 18 BRPM | WEIGHT: 293 LBS | HEART RATE: 76 BPM | DIASTOLIC BLOOD PRESSURE: 82 MMHG | SYSTOLIC BLOOD PRESSURE: 110 MMHG | BODY MASS INDEX: 45.99 KG/M2 | OXYGEN SATURATION: 98 % | HEIGHT: 67 IN

## 2022-08-11 DIAGNOSIS — Z76.89 ENCOUNTER TO ESTABLISH CARE: Primary | ICD-10-CM

## 2022-08-11 DIAGNOSIS — Z76.89 ENCOUNTER FOR WEIGHT MANAGEMENT: ICD-10-CM

## 2022-08-11 PROCEDURE — 99203 OFFICE O/P NEW LOW 30 MIN: CPT

## 2022-08-11 RX ORDER — LANOLIN ALCOHOL/MO/W.PET/CERES
CREAM (GRAM) TOPICAL DAILY
COMMUNITY

## 2022-08-11 RX ORDER — ASCORBATE CALCIUM 500 MG
500 TABLET ORAL DAILY
COMMUNITY

## 2022-08-11 NOTE — PROGRESS NOTES
Assessment/Plan:    No problem-specific Assessment & Plan notes found for this encounter  Diagnoses and all orders for this visit:    Encounter to establish care    BMI 50 0-59 9, adult [facility])  Comments:     Orders:  -     Ambulatory Referral to Weight Management; Future    Encounter for weight management  Comments:  Nida Shoemaker reports recent weight gain  Recommended keeping food journal with foods, feelings, and situations in order to find patterns and habits that can be transitioned to encourage weight loss  She was also given referral to medical weight loss clinic  Other orders  -     Calcium Ascorbate (VITAMIN C) 500 mg tablet; Take 500 mg by mouth daily  -     CALCIUM CITRATE-VITAMIN D PO; Take by mouth  -     vitamin B-12 (VITAMIN B-12) 1,000 mcg tablet; Take by mouth daily    The patient indicates understanding of these issues and agrees with the plan  Return in about 4 weeks (around 9/8/2022) for Recheck  Subjective:      Patient ID: Shine Rm is a 46 y o  female  Nida Shoemaker is a 47 yo female who presents to establish care and for increased weight  She says that she has noticed that her weight has been increasing in the past few years and she would like to start losing weight  She states that she does not have a formal exercise regimen that she follows due to being very busy with work  Her diet consists of frequent snacking and sweets  She says that she knows what is healthy and has previously lost significant amount of weight through gastric bypass surgery  Her maximum weight was in 400lbs and her lowest weight since surgery she says was in low 200lbs prior to her pregnancy with her damaris  Since her pregnancy, she has noticed that she has steadily gained weight back is still weighing significantly less than her highest weight years ago   She says that the snacking is her greatest difficulty and it begins from the morning at work throughout the day with her dinners typically being late in the day around 8 or 9 pm and then continued snacking up until around midnight  She does not meal plan and on a typical day states that she eats out with others for lunch may consist of a sandwich or pizza from local shops  She says that her  eats very healthy and her dinners are typically more healthful  She states that she eats fruits and vegetables each day but moreso snacks and sweets  She says that when she had her weight loss surgery in the past, she saw psychology to discuss her eating and weight loss goals    She states that she had previously been prescribed a weight loss medicine but does not recall the name of it and that there was difficulty with insurance approval, so she was unable to start using the medication  The following portions of the patient's history were reviewed and updated as appropriaste: allergies, current medications, past family history, past medical history, past social history, past surgical history and problem list     Review of Systems   Constitutional: Negative for activity change, appetite change, chills, fever and unexpected weight change  HENT: Negative for congestion and sore throat  Respiratory: Negative for chest tightness and shortness of breath  Cardiovascular: Negative for chest pain and palpitations  Gastrointestinal: Negative for abdominal pain, nausea and vomiting  Endocrine: Negative for polydipsia and polyuria  Genitourinary: Negative for dysuria  Skin: Negative for rash and wound  Objective:      /82   Pulse 76   Temp 98 °F (36 7 °C)   Resp 18   Ht 5' 7" (1 702 m)   Wt (!) 164 kg (362 lb 3 2 oz)   LMP 09/28/2020 (Exact Date)   SpO2 98%   BMI 56 73 kg/m²          Physical Exam  Vitals reviewed  Constitutional:       General: She is not in acute distress  Comments: BMI is 56 73 kg/m²  HENT:      Head: Normocephalic and atraumatic  Nose: No congestion        Mouth/Throat:      Mouth: Mucous membranes are moist  Pharynx: Oropharynx is clear  Eyes:      General: No scleral icterus  Pupils: Pupils are equal, round, and reactive to light  Cardiovascular:      Rate and Rhythm: Normal rate and regular rhythm  Pulses: Normal pulses  Heart sounds: Normal heart sounds  No murmur heard  Pulmonary:      Effort: Pulmonary effort is normal  No respiratory distress  Breath sounds: No rhonchi or rales  Abdominal:      General: Bowel sounds are normal       Palpations: Abdomen is soft  Tenderness: There is no abdominal tenderness  There is no guarding or rebound  Skin:     General: Skin is warm and dry  Capillary Refill: Capillary refill takes less than 2 seconds  Neurological:      Mental Status: She is alert     Psychiatric:         Mood and Affect: Mood normal          Behavior: Behavior normal

## 2022-08-11 NOTE — PATIENT INSTRUCTIONS
Please begin logging your meals, feelings, situations  Please increase your water intake  You've been given referral to weight management

## 2022-10-13 ENCOUNTER — RA CDI HCC (OUTPATIENT)
Dept: OTHER | Facility: HOSPITAL | Age: 52
End: 2022-10-13

## 2022-10-13 NOTE — PROGRESS NOTES
[facility]  coding opportunities       Chart Reviewed number of suggestions sent to Provider: 1     Patients Insurance     Commercial Insurance: [facility]       [id number]

## 2022-10-20 ENCOUNTER — OFFICE VISIT (OUTPATIENT)
Dept: FAMILY MEDICINE CLINIC | Facility: OTHER | Age: 52
End: 2022-10-20
Payer: COMMERCIAL

## 2022-10-20 VITALS
SYSTOLIC BLOOD PRESSURE: 110 MMHG | OXYGEN SATURATION: 98 % | HEART RATE: 78 BPM | DIASTOLIC BLOOD PRESSURE: 82 MMHG | HEIGHT: 67 IN | WEIGHT: 293 LBS | RESPIRATION RATE: 16 BRPM | BODY MASS INDEX: 45.99 KG/M2 | TEMPERATURE: 98 F

## 2022-10-20 DIAGNOSIS — Z12.11 SCREEN FOR COLON CANCER: ICD-10-CM

## 2022-10-20 DIAGNOSIS — Z98.84 STATUS POST BARIATRIC SURGERY: ICD-10-CM

## 2022-10-20 DIAGNOSIS — Z11.4 SCREENING FOR HIV (HUMAN IMMUNODEFICIENCY VIRUS): ICD-10-CM

## 2022-10-20 DIAGNOSIS — R63.8 UNABLE TO LOSE WEIGHT: Primary | ICD-10-CM

## 2022-10-20 PROCEDURE — 99213 OFFICE O/P EST LOW 20 MIN: CPT | Performed by: FAMILY MEDICINE

## 2022-10-21 NOTE — PROGRESS NOTES
Name: Luna Samaniego      : 1970      MRN: [id number]  Encounter Provider: Lui Morris DO  Encounter Date: 10/20/2022   Encounter department: [address] [place] [address]      1  Unable to lose weight  -     Ambulatory Referral to Weight Management; Future    2  Status post bariatric surgery  -     Ambulatory Referral to Weight Management; Future    3  BMI 50 0-59 9, adult [facility])  -     Ambulatory Referral to Weight Management; Future    4  Screening for HIV (human immunodeficiency virus)  -     HIV 1/2 Antigen/Antibody (4th Generation) w Reflex [facility]; Future    5  Screen for colon cancer  -     Ambulatory referral for colonoscopy; Future    72-year-old female with history of prior gastric bypass surgery presents to discuss weight management options  Given her prior surgery, I explained that it would be in her best interest to establish a relationship with [facility] bariatric surgery/medical weight management program   Their general intake process and philosophy was discussed with the patient and I encouraged her to make an appointment with them to discuss her personalized treatment options  BMI Counseling: Body mass index is 55 6 kg/m²  The BMI is above normal  Nutrition recommendations include decreasing portion sizes, encouraging healthy choices of fruits and vegetables, decreasing fast food intake, consuming healthier snacks, limiting drinks that contain sugar, moderation in carbohydrate intake, increasing intake of lean protein, reducing intake of saturated and trans fat and reducing intake of cholesterol  Exercise recommendations include moderate physical activity 150 minutes/week  Patient referred to weight management  Rationale for BMI follow-up plan is due to patient being overweight or obese  Depression Screening and Follow-up Plan: Patient was screened for depression during today's encounter  They screened negative with a PHQ-2 score of 0           Return in about 6 months (around 4/20/2023) for Annual physical     The patient indicates understanding of these issues and agrees with the plan  Subjective      HPI   Patient presents for follow-up to weight management   Concerns were initially addressed at her 1st visit with us on 8/11/2022  Patient reports history of gastric bypass surgery approximately 20 years ago and subsequent successful weight loss of approximately 200 lb  Following her bypass surgery, she became pregnant with her son and slowly noticed weight gain over the years  She has seen multiple bariatric providers, the most recent was a few years ago at [facility]  She states that her former PCP (recently retired) had tried to get her a GLP 1 receptor agonist for weight loss but this was denied by her insurance company    Patient reports that she has been making an attempt to clean up her diet and has even started pursuing intermittent fasting  She is looking for more advice on how to proceed, beyond “diet and exercise”  Ms Kary Zhou is highly motivated to lose weight and wishes to do so safely and efficiently        Review of Systems   Constitutional: Negative for activity change, fatigue and fever  HENT: Negative for congestion, ear pain, sinus pain and sore throat  Eyes: Negative for pain and itching  Respiratory: Negative for cough and shortness of breath  Cardiovascular: Negative for chest pain and palpitations  Gastrointestinal: Negative for abdominal pain, constipation, diarrhea, nausea and vomiting  Endocrine: Negative for cold intolerance and heat intolerance  Genitourinary: Negative for dysuria  Musculoskeletal: Negative for myalgias  Skin: Negative for color change and rash  Neurological: Negative for dizziness, syncope and headaches  Hematological: Negative for adenopathy  Psychiatric/Behavioral: Negative for behavioral problems, dysphoric mood and sleep disturbance  The patient is not nervous/anxious  Current Outpatient Medications on File Prior to Visit   Medication Sig   • Biotin 1 MG CAPS Take by mouth   • Calcium Ascorbate (VITAMIN C) 500 mg tablet Take 500 mg by mouth daily   • CALCIUM CITRATE-VITAMIN D PO Take by mouth   • CALCIUM PO Take by mouth daily   • ferrous gluconate (FERGON) 324 mg tablet Take 324 mg by mouth daily with breakfast   • magnesium 30 MG tablet Take 500 mg by mouth in the morning   • mometasone (ELOCON) 0 1 % cream Apply to external ear BID for 7 days then once weekly at bedtime   • Multiple Vitamins-Minerals (multivitamin with minerals) tablet Take 1 tablet by mouth daily   • ofloxacin (OCUFLOX) 0 3 % ophthalmic solution 5 drops left ear BID for 7 days   • vitamin B-12 (VITAMIN B-12) 1,000 mcg tablet Take by mouth daily       Objective     /82   Pulse 78   Temp 98 °F (36 7 °C)   Resp 16   Ht 5' 7" (1 702 m)   Wt (!) 161 kg (355 lb)   LMP 09/28/2020 (Exact Date)   SpO2 98%   BMI 55 60 kg/m²     Physical Exam  Vitals and nursing note reviewed  Constitutional:       General: She is not in acute distress  Appearance: Normal appearance  She is well-developed  She is not ill-appearing  Comments: Body mass index is 55 6 kg/m²  HENT:      Head: Normocephalic and atraumatic  Right Ear: External ear normal       Left Ear: External ear normal       Nose: Nose normal    Eyes:      General: No scleral icterus  Conjunctiva/sclera: Conjunctivae normal       Pupils: Pupils are equal, round, and reactive to light  Neck:      Thyroid: No thyromegaly  Cardiovascular:      Rate and Rhythm: Normal rate and regular rhythm  Heart sounds: Normal heart sounds  No murmur heard  Pulmonary:      Effort: Pulmonary effort is normal  No respiratory distress  Breath sounds: Normal breath sounds  No wheezing  Abdominal:      General: Bowel sounds are normal  There is no distension  Palpations: Abdomen is soft  Tenderness:  There is no abdominal tenderness  Musculoskeletal:         General: No tenderness  Normal range of motion  Cervical back: Normal range of motion and neck supple  Right lower leg: Edema present  Left lower leg: Edema present  Lymphadenopathy:      Cervical: No cervical adenopathy  Skin:     General: Skin is warm and dry  Coloration: Skin is not jaundiced  Findings: No rash  Neurological:      General: No focal deficit present  Mental Status: She is alert and oriented to person, place, and time  Cranial Nerves: No cranial nerve deficit        Gait: Gait normal    Psychiatric:         Mood and Affect: Mood normal          Behavior: Behavior normal        Margarita Milian, DO

## 2022-11-08 ENCOUNTER — TELEPHONE (OUTPATIENT)
Dept: BARIATRICS | Facility: CLINIC | Age: 52
End: 2022-11-08

## 2022-11-08 DIAGNOSIS — Z98.84 STATUS POST BARIATRIC SURGERY: Primary | ICD-10-CM

## 2022-11-08 NOTE — TELEPHONE ENCOUNTER
Transfer patient  Called patient and left vm to inform that a UGI order has been placed and to give our office a call for further instructions

## 2022-11-08 NOTE — TELEPHONE ENCOUNTER
Pt called back and ask about out various locations and stated all were to far for her to travel to and will stick with [facility]

## 2022-11-14 ENCOUNTER — ANNUAL EXAM (OUTPATIENT)
Dept: OBGYN CLINIC | Facility: CLINIC | Age: 52
End: 2022-11-14

## 2022-11-14 VITALS
BODY MASS INDEX: 45.99 KG/M2 | WEIGHT: 293 LBS | SYSTOLIC BLOOD PRESSURE: 106 MMHG | DIASTOLIC BLOOD PRESSURE: 80 MMHG | HEIGHT: 67 IN

## 2022-11-14 DIAGNOSIS — Z12.31 ENCOUNTER FOR SCREENING MAMMOGRAM FOR MALIGNANT NEOPLASM OF BREAST: ICD-10-CM

## 2022-11-14 DIAGNOSIS — E66.01 MORBID OBESITY (HCC): ICD-10-CM

## 2022-11-14 DIAGNOSIS — N89.8 VAGINAL DRYNESS: ICD-10-CM

## 2022-11-14 DIAGNOSIS — Z01.419 ROUTINE GYNECOLOGICAL EXAMINATION: Primary | ICD-10-CM

## 2022-11-14 NOTE — PATIENT INSTRUCTIONS
For Vaginal Lubrication:     Coconut oil (Do not use if allergic)           Silicone based lubricant:  Uber Lube  AstroGlide  Replens silky smooth lubricant, premium silicone based lubricant for intercourse   ( use as directed, a small amount will provide an enhanced natural feeling)

## 2022-11-14 NOTE — PROGRESS NOTES
Assessment   46 y o   presenting for annual exam      Plan   Diagnoses and all orders for this visit:    Routine gynecological examination    Normal findings on routine exam   Encouraged 150 min of exercise per week  Reviewed balanced diet  Multivitamin encouraged   Breast awareness/SBE encouraged     Encounter for screening mammogram for malignant neoplasm of breast  -     Mammo screening bilateral w 3d & cad; Future    Morbid obesity ([address] )  Expresses she is unhappy with weight  Currently trying intermittent fasting but has difficulty with binging  Has referral to weight management from PCP  She feels she knows what she needs to do to lose weight but has trouble with cravings  We reviewed benefit of evaluation and accountability with program  She will consider  Vaginal dryness  We reviewed various tx options  She will begin by using coconut oil and/or a silicone based lubricant, and f/u if sx persist          Pap due   Mammo slip given   Colonoscopy due     RTO one year for yearly exam or sooner as needed  __________________________________________________________________    Subjective     Shanon Grijalva is a 46 y o  [id number] presenting for annual exam  She is without complaint and does not want STD testing today  Her son in 8 and well  She enjoys traveling  Family vacation to [facility] coming up in a few months, then a trip to [facility] and [facility]  Her  is from [facility] and is well  SCREENING  Last Pap: 2020 NILM/neg  Last Mammo: 2022 BIRADS 1 - Negative  Last Colonoscopy: 2019 5 year recall due to fair bowel prep  GYN  Periods are rare - perimenopausal - longest interval between periods is 9-10 months, but has never gone full year  LMP 2022  Sexually active: Yes - single partner -   Complaints: vaginal dryness - has not tried anything for this  Hx Abnormal pap: denies  We reviewed ASCCP guidelines for Pap testing today      Denies vaginal discharge, itching, odor, dyspareunia, pelvic pain and vulvar/vaginal symptoms      OB         Complaints: denies   Denies urgency, frequency, hematuria, leakage / change in stream, difficulty urinating  BREAST  Complaints: denies   Denies: breast lump, breast tenderness, nipple discharge, skin color change, and skin lesion(s)  Personal hx: none reported       Pertinent Family Hx:   Family hx of breast cancer: no  Family hx of ovarian cancer: no  Family hx of colon cancer: no      GENERAL  PMH reviewed/updated and is as below  Patient does follow with a PCP        Exercise - none     Past Medical History:   Diagnosis Date   • History of DVT in adulthood     RLE -likely secondary to birth control medication   • Hx of gastric bypass        • Iron deficiency anemia    • Melanoma ([facility] [address] )     left cheek   • Morbid obesity ([facility] [address] )    • Vitamin B 12 deficiency    • Wears contact lenses        Past Surgical History:   Procedure Laterality Date   • ADENOIDECTOMY     • COLONOSCOPY     • ESOPHAGOGASTRODUODENOSCOPY     • FACIAL/NECK BIOPSY Left 2019    Procedure: CHEEK MELANOMA EXCISION WITH RECONSTRUCTION;  Surgeon: Larry Hernández MD;  Location: [facility];  Service: Plastics   • QUINTIN-EN-Y PROCEDURE           Current Outpatient Medications:   •  Biotin 1 MG CAPS, Take by mouth, Disp: , Rfl:   •  Calcium Ascorbate (VITAMIN C) 500 mg tablet, Take 500 mg by mouth daily, Disp: , Rfl:   •  CALCIUM PO, Take by mouth daily, Disp: , Rfl:   •  magnesium 30 MG tablet, Take 500 mg by mouth in the morning, Disp: , Rfl:   •  Multiple Vitamins-Minerals (multivitamin with minerals) tablet, Take 1 tablet by mouth daily, Disp: , Rfl:   •  ofloxacin (OCUFLOX) 0 3 % ophthalmic solution, 5 drops left ear BID for 7 days, Disp: 10 mL, Rfl: 1  •  Omega-3 Fatty Acids (FISH OIL PO), Take by mouth, Disp: , Rfl:   •  vitamin B-12 (VITAMIN B-12) 1,000 mcg tablet, Take by mouth daily, Disp: , Rfl:   •  CALCIUM CITRATE-VITAMIN D PO, Take by mouth (Patient not taking: Reported on 11/14/2022), Disp: , Rfl:   •  ferrous gluconate (FERGON) 324 mg tablet, Take 324 mg by mouth daily with breakfast (Patient not taking: Reported on 11/14/2022), Disp: , Rfl:   •  mometasone (ELOCON) 0 1 % cream, Apply to external ear BID for 7 days then once weekly at bedtime (Patient not taking: Reported on 11/14/2022), Disp: 45 g, Rfl: 3    Allergies   Allergen Reactions   • Iodine - Food Allergy GI Intolerance   • Other Rash     Antibiotic - unsure of name/ and also has allergy to hair dye- scalp bleeding       Social History     Socioeconomic History   • Marital status: /Civil Union     Spouse name: Not on file   • Number of children: Not on file   • Years of education: Not on file   • Highest education level: Not on file   Occupational History   • Not on file   Tobacco Use   • Smoking status: Never Smoker   • Smokeless tobacco: Never Used   Vaping Use   • Vaping Use: Never used   Substance and Sexual Activity   • Alcohol use: No   • Drug use: No   • Sexual activity: Yes     Partners: Male     Birth control/protection: Post-menopausal   Other Topics Concern   • Not on file   Social History Narrative    CONSUMES 5 CUPS, 1 CUP OF TEA AND 1 SODA PER DAY     Social Determinants of Health     Financial Resource Strain: Not on file   Food Insecurity: Not on file   Transportation Needs: Not on file   Physical Activity: Not on file   Stress: Not on file   Social Connections: Not on file   Intimate Partner Violence: Not on file   Housing Stability: Not on file       Review of Systems     ROS:  Constitutional: Negative for fatigue and unexpected weight change  Respiratory: Negative for cough and shortness of breath  Cardiovascular: Negative for chest pain and palpitations  Gastrointestinal: Negative for abdominal pain and change in bowel habits  Breasts:  Negative, other than as noted above  Genitourinary: Negative, other than as noted above     Psychiatric: Negative for mood difficulties  Objective      /80 (BP Location: Left arm, Patient Position: Sitting, Cuff Size: Large)   Ht 5' 7 25" (1 708 m)   Wt (!) 161 kg (354 lb)   LMP  (LMP Unknown)   BMI 55 03 kg/m²     Physical Examination:    Patient appears well and is not in distress  Obese  Neck is supple without masses  Breasts are symmetrical without mass, tenderness, nipple discharge, skin changes or adenopathy  Abdomen is soft and nontender without masses  External genitals are normal without lesions or rashes  Urethral meatus and urethra are normal  Bladder is normal to palpation  Vagina is normal without discharge or bleeding  Cervix is normal without discharge or lesion  Uterus is normal, mobile, nontender without palpable mass  Adnexa are normal, nontender, without palpable mass

## 2023-01-11 ENCOUNTER — TELEPHONE (OUTPATIENT)
Dept: FAMILY MEDICINE CLINIC | Facility: OTHER | Age: 53
End: 2023-01-11

## 2023-01-11 ENCOUNTER — OFFICE VISIT (OUTPATIENT)
Dept: URGENT CARE | Age: 53
End: 2023-01-11

## 2023-01-11 ENCOUNTER — APPOINTMENT (OUTPATIENT)
Dept: RADIOLOGY | Age: 53
End: 2023-01-11

## 2023-01-11 VITALS
WEIGHT: 293 LBS | RESPIRATION RATE: 18 BRPM | TEMPERATURE: 96.5 F | DIASTOLIC BLOOD PRESSURE: 78 MMHG | BODY MASS INDEX: 55.91 KG/M2 | SYSTOLIC BLOOD PRESSURE: 130 MMHG | HEART RATE: 71 BPM | OXYGEN SATURATION: 98 %

## 2023-01-11 DIAGNOSIS — S23.9XXA SPRAIN OF THORAX: ICD-10-CM
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
## 2023-01-11 DIAGNOSIS — S43.401A SPRAIN OF RIGHT SHOULDER, UNSPECIFIED SHOULDER SPRAIN TYPE, INITIAL ENCOUNTER: ICD-10-CM
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
## 2023-01-11 NOTE — PATIENT INSTRUCTIONS
Chest and shoulder x-rays reviewed, no abnormality noted, awaiting official read  Continue to rest, may apply heat as needed for discomfort  Alternate Tylenol 650 mg every 4-6 hours with ibuprofen 600 mg every 6-8 hours as desired  Practice attached exercises 1-3 times daily for increased range of motion  Follow-up with primary care provider if symptoms do not resolve within 1 to 2 weeks

## 2023-01-11 NOTE — TELEPHONE ENCOUNTER
The patient called stating she had fallen flat on her chest about a week ago  She said the bruising has improved but she is still in a great deal of pain and it's difficult to take a deep breath  She feels that she has possibly broken some ribs  I advised her to go to  so they can do an Xray instead of coming here first and then us sending her for the Xray    The patient was looking for the least amount of appts because of her pain

## 2023-01-11 NOTE — PROGRESS NOTES
[facility]        NAME: Dov Stark is a 46 y o  female  : 1970    MRN: [id number]  DATE: 2023  TIME: 6:47 PM    Assessment and Plan   Fall, initial encounter [W19  XXXA]  1  Fall, initial encounter  XR chest pa & lateral    XR shoulder 2+ vw right      2  Sprain of thorax        3  Sprain of right shoulder, unspecified shoulder sprain type, initial encounter        Chest and shoulder x-rays reviewed, no abnormality noted, awaiting official read  Continue to rest, may apply heat as needed for discomfort  Alternate Tylenol 650 mg every 4-6 hours with ibuprofen 600 mg every 6-8 hours as desired  Practice attached exercises 1-3 times daily for increased range of motion  Follow-up with primary care provider if symptoms do not resolve within 1 to 2 weeks  Patient Instructions   Sprain   WHAT YOU NEED TO KNOW:   A sprain is a stretched or torn ligament  Ligaments support your joints and keep your bones in place  They allow you to lift, lower, or rotate your arms and legs  A sprain may involve one or more ligaments  DISCHARGE INSTRUCTIONS:   Return to the emergency department if:   • You have numbness or tingling below the injury, such as in your fingers or toes      • The skin over your sprained area is blue or pale      • Your pain has increased or returned, even after you take pain medicine      Call your doctor if:   • Your symptoms do not better      • Your swelling has increased or returned      • Your joint becomes more weak or unstable      • You have questions or concerns about your condition or care      Medicines: You may need any of the following:  • Prescription pain medicine  may be given  Ask your healthcare provider how to take this medicine safely  Some prescription pain medicines contain acetaminophen  Do not take other medicines that contain acetaminophen without talking to your healthcare provider  Too much acetaminophen may cause liver damage   Prescription pain medicine may cause constipation  Ask your healthcare provider how to prevent or treat constipation       • Acetaminophen  decreases pain and fever  It is available without a doctor's order  Ask how much to take and how often to take it  Follow directions  Read the labels of all other medicines you are using to see if they also contain acetaminophen, or ask your doctor or pharmacist  Acetaminophen can cause liver damage if not taken correctly  Do not use more than 4 grams (4,000 milligrams) total of acetaminophen in one day       • NSAIDs , such as ibuprofen, help decrease swelling, pain, and fever  This medicine is available with or without a doctor's order  NSAIDs can cause stomach bleeding or kidney problems in certain people  If you take blood thinner medicine, always ask your healthcare provider if NSAIDs are safe for you  Always read the medicine label and follow directions      • Take your medicine as directed  Contact your healthcare provider if you think your medicine is not helping or if you have side effects  Tell him or her if you are allergic to any medicine  Keep a list of the medicines, vitamins, and herbs you take  Include the amounts, and when and why you take them  Bring the list or the pill bottles to follow-up visits  Carry your medicine list with you in case of an emergency      A support device  such as an elastic bandage, splint, brace, or cast may be needed  These devices limit movement and protect the joint  You may need to use crutches if the sprain is in your leg  This will help decrease your pain as you move around  Self-care:   • Rest  your joint so that it can heal  Return to normal activities as directed      • Apply ice  on your injury for 15 to 20 minutes every hour or as directed  Use an ice pack, or put crushed ice in a plastic bag  Cover the bag with a towel before you apply it   Ice helps prevent tissue damage and decreases swelling and pain      • Compress  the injured area as directed  Ask your healthcare provider if you should wrap an elastic bandage around your injured ligament  An elastic bandage provides support and helps decrease swelling and movement so your joint can heal           • Elevate  the injured area above the level of your heart as often as you can  This will help decrease swelling and pain  Prop the injured area on pillows or blankets to keep it elevated comfortably      Physical therapy:  A physical therapist teaches you exercises to help improve movement and strength, and to decrease pain  Prevent another sprain:  Regular exercise can strengthen your muscles and help prevent another injury  Do the following before you begin or return to regular exercise or sports training:  • Ask your healthcare provider about the activities you can do  Find out how long your ligament needs to heal  Do not do any physical activity until your healthcare provider says it is okay  If you start activity too soon, you may develop a more serious injury      • Always warm up and stretch  before exercise, sport, or physical activity      • Go slowly  Slowly increase how often and how long you exercise or train  Sudden increases in how often you train may cause you to overstretch or tear your ligament      • Use the right equipment  Always wear shoes that fit well and are made for the activity that you are doing  You may also use ankle supports, elbow and knee pads, or braces      Follow up with your doctor as directed:  Write down your questions so you remember to ask them during your visits  © Copyright [employer] 2022 Information is for End User's use only and may not be sold, redistributed or otherwise used for commercial purposes  All illustrations and images included in CareNotes® are the copyrighted property of [employer]  or Kirill [employer]  The above information is an  only  It is not intended as medical advice for individual conditions or treatments  Talk to your doctor, nurse or pharmacist before following any medical regimen to see if it is safe and effective for you  Follow up with PCP in 3-5 days  Proceed to  ER if symptoms worsen  Chief Complaint     Chief Complaint   Patient presents with   • Rib Injury     Patient stated she fell down hurting right ribs and right shoulder blade   When taking a deep breath it hurts more it happen last week  History of Present Illness       Patient is a 59-year-old female with past medical history significant for gastric bypass surgery, DVT, who presents for evaluation of right rib and shoulder pain since sustaining a fall approximately 1 week ago  She reports that she was jogging in a parking lot, when she tripped and fell forward, landing flat with her arms outstretched  She initially sustained superficial abrasions to bilateral palms which have healed  She notes continued pain radiating from her posterior thorax to her anterior rib cage under her right breast   The pain also radiates to her right shoulder and she is experiencing pain with extension of the right shoulder  She denies joint swelling, open wound, chest pain or palpitations  Review of Systems   Review of Systems   Constitutional: Negative for fatigue and fever  HENT: Negative for congestion, ear discharge, ear pain, postnasal drip, rhinorrhea, sinus pressure, sinus pain, sneezing and sore throat  Eyes: Negative  Negative for pain, discharge, redness and itching  Respiratory: Negative  Negative for apnea, cough, choking, chest tightness, shortness of breath and stridor  Cardiovascular: Negative  Negative for chest pain and palpitations  Gastrointestinal: Negative  Negative for diarrhea, nausea and vomiting  Endocrine: Negative  Negative for polydipsia, polyphagia and polyuria  Genitourinary: Negative  Negative for decreased urine volume and flank pain  Musculoskeletal: Positive for arthralgias   Negative for back pain, myalgias, neck pain and neck stiffness  Skin: Negative  Negative for color change and rash  Allergic/Immunologic: Negative  Negative for environmental allergies  Neurological: Negative  Negative for dizziness, facial asymmetry, light-headedness, numbness and headaches  Hematological: Negative  Negative for adenopathy  Psychiatric/Behavioral: Negative            Current Medications       Current Outpatient Medications:   •  Biotin 1 MG CAPS, Take by mouth, Disp: , Rfl:   •  Calcium Ascorbate (VITAMIN C) 500 mg tablet, Take 500 mg by mouth daily, Disp: , Rfl:   •  CALCIUM CITRATE-VITAMIN D PO, Take by mouth (Patient not taking: No sig reported), Disp: , Rfl:   •  CALCIUM PO, Take by mouth daily, Disp: , Rfl:   •  ferrous gluconate (FERGON) 324 mg tablet, Take 324 mg by mouth daily with breakfast (Patient not taking: No sig reported), Disp: , Rfl:   •  magnesium 30 MG tablet, Take 500 mg by mouth in the morning, Disp: , Rfl:   •  mometasone (ELOCON) 0 1 % lotion, 4 drops each ear once weekly at bedtime, Disp: 60 mL, Rfl: 2  •  Multiple Vitamins-Minerals (multivitamin with minerals) tablet, Take 1 tablet by mouth daily, Disp: , Rfl:   •  ofloxacin (OCUFLOX) 0 3 % ophthalmic solution, 5 drops left ear BID for 7 days, Disp: 10 mL, Rfl: 1  •  Omega-3 Fatty Acids (FISH OIL PO), Take by mouth, Disp: , Rfl:   •  vitamin B-12 (VITAMIN B-12) 1,000 mcg tablet, Take by mouth daily, Disp: , Rfl:     Current Allergies     Allergies as of 01/11/2023 - Reviewed 01/11/2023   Allergen Reaction Noted   • Iodine - food allergy GI Intolerance 08/18/2017   • Other Rash 08/18/2017            The following portions of the patient's history were reviewed and updated as appropriate: allergies, current medications, past family history, past medical history, past social history, past surgical history and problem list      Past Medical History:   Diagnosis Date   • History of DVT in adulthood     RLE -likely secondary to birth control medication   • Hx of gastric bypass     2003   • Iron deficiency anemia    • Melanoma ([address] )     left cheek   • Morbid obesity ([facility] [address] )    • Vitamin B 12 deficiency    • Wears contact lenses        Past Surgical History:   Procedure Laterality Date   • ADENOIDECTOMY     • COLONOSCOPY     • ESOPHAGOGASTRODUODENOSCOPY     • FACIAL/NECK BIOPSY Left 11/27/2019    Procedure: CHEEK MELANOMA EXCISION WITH RECONSTRUCTION;  Surgeon: Eliseo Rangel MD;  Location: [facility];  Service: Plastics   • QUINTIN-EN-Y PROCEDURE         Family History   Problem Relation Age of Onset   • Hypertension Mother    • Fibroids Mother    • Leukemia Father    • Thyroid cancer Maternal Grandmother          Medications have been verified  Objective   /78   Pulse 71   Temp (!) 96 5 °F (35 8 °C) (Temporal)   Resp 18   Wt (!) 162 kg (357 lb)   SpO2 98%   BMI 55 91 kg/m²        Physical Exam     Physical Exam  Vitals and nursing note reviewed  Constitutional:       General: She is not in acute distress  Appearance: Normal appearance  She is not ill-appearing, toxic-appearing or diaphoretic  HENT:      Head: Normocephalic and atraumatic  Right Ear: Tympanic membrane normal       Left Ear: Tympanic membrane normal       Nose: Nose normal  No congestion or rhinorrhea  Mouth/Throat:      Mouth: Mucous membranes are moist    Eyes:      Extraocular Movements: Extraocular movements intact  Conjunctiva/sclera: Conjunctivae normal       Pupils: Pupils are equal, round, and reactive to light  Cardiovascular:      Rate and Rhythm: Normal rate and regular rhythm  Pulses: Normal pulses  Heart sounds: Normal heart sounds  No murmur heard  No friction rub  No gallop  Pulmonary:      Effort: Pulmonary effort is normal  No respiratory distress  Breath sounds: Normal breath sounds  No stridor  No wheezing, rhonchi or rales  Chest:      Chest wall: Tenderness present   No mass, lacerations, deformity, swelling, crepitus or edema  There is no dullness to percussion  Comments: Mild tenderness to palpation of right rib and right thoracic spine  Abdominal:      General: Bowel sounds are normal       Palpations: Abdomen is soft  Tenderness: There is no abdominal tenderness  There is no guarding or rebound  Musculoskeletal:         General: Normal range of motion  Right shoulder: Normal  No swelling, deformity, effusion, laceration, tenderness, bony tenderness or crepitus  Normal range of motion  Normal strength  Normal pulse  Cervical back: Normal range of motion and neck supple  No tenderness  Thoracic back: Tenderness present  No swelling, edema, deformity, signs of trauma, lacerations, spasms or bony tenderness  Normal range of motion  No scoliosis  Back:       Comments: Full ROM of right shoulder with some discomfort on full extension  Skin:     General: Skin is warm and dry  Capillary Refill: Capillary refill takes less than 2 seconds  Neurological:      General: No focal deficit present  Mental Status: She is alert and oriented to person, place, and time  Cranial Nerves: No cranial nerve deficit     Psychiatric:         Mood and Affect: Mood normal          Behavior: Behavior normal

## 2023-01-17 ENCOUNTER — HOSPITAL ENCOUNTER (OUTPATIENT)
Dept: RADIOLOGY | Facility: HOSPITAL | Age: 53
Discharge: HOME | End: 2023-01-17
Attending: SURGERY

## 2023-01-17 DIAGNOSIS — Z98.84 STATUS POST BARIATRIC SURGERY: ICD-10-CM

## 2023-02-15 ENCOUNTER — OFFICE VISIT (OUTPATIENT)
Dept: BARIATRICS | Facility: CLINIC | Age: 53
End: 2023-02-15

## 2023-02-15 VITALS
HEIGHT: 68 IN | SYSTOLIC BLOOD PRESSURE: 120 MMHG | BODY MASS INDEX: 44.41 KG/M2 | DIASTOLIC BLOOD PRESSURE: 78 MMHG | HEART RATE: 76 BPM | TEMPERATURE: 99 F | WEIGHT: 293 LBS

## 2023-02-15 DIAGNOSIS — Z98.84 WEIGHT GAIN FOLLOWING GASTRIC BYPASS SURGERY: ICD-10-CM

## 2023-02-15 DIAGNOSIS — K91.2 POSTSURGICAL MALABSORPTION: ICD-10-CM

## 2023-02-15 DIAGNOSIS — Z98.84 S/P GASTRIC BYPASS: ICD-10-CM

## 2023-02-15 DIAGNOSIS — R63.5 WEIGHT GAIN FOLLOWING GASTRIC BYPASS SURGERY: ICD-10-CM

## 2023-02-15 DIAGNOSIS — E66.01 MORBID (SEVERE) OBESITY DUE TO EXCESS CALORIES (HCC): Primary | ICD-10-CM

## 2023-02-15 NOTE — PROGRESS NOTES
INITIAL VISIT - BARIATRIC SURGERY  Maida Ward 46 y o  female MRN: [id number]  Unit/Bed#:  Encounter: [id number]      HPI:  Maida Ward is a 46 y o  female who presents to the office status post gastric bypass and now weight regain  Here to review the results of her UGI      Review of Systems   All other systems reviewed and are negative  Historical Information   Past Medical History:   Diagnosis Date   • History of DVT in adulthood     RLE -likely secondary to birth control medication   • Hx of gastric bypass     2003   • Iron deficiency anemia    • Melanoma ([address] )     left cheek   • Morbid obesity ([address] )    • Vitamin B 12 deficiency    • Wears contact lenses      Past Surgical History:   Procedure Laterality Date   • ADENOIDECTOMY     • COLONOSCOPY     • ESOPHAGOGASTRODUODENOSCOPY     • FACIAL/NECK BIOPSY Left 11/27/2019    Procedure: CHEEK MELANOMA EXCISION WITH RECONSTRUCTION;  Surgeon: Cedric Dumas MD;  Location: [facility] OR;  Service: Plastics   • QUINTIN-EN-Y PROCEDURE       Social History   Social History     Substance and Sexual Activity   Alcohol Use No     Social History     Substance and Sexual Activity   Drug Use No     Social History     Tobacco Use   Smoking Status Never   Smokeless Tobacco Never     Family History: non-contributory    Meds/Allergies   all medications and allergies reviewed  Allergies   Allergen Reactions   • Iodine - Food Allergy GI Intolerance   • Other Rash     Antibiotic - unsure of name/ and also has allergy to hair dye- scalp bleeding       Objective       Current Vitals:   Blood Pressure: 120/78 (02/15/23 1001)  Pulse: 76 (02/15/23 1001)  Temperature: 99 °F (37 2 °C) (02/15/23 1001)  Temp Source: Tympanic (02/15/23 1001)  Height: 5' 8" (172 7 cm) (02/15/23 1001)  Weight - Scale: (!) 165 kg (363 lb 8 oz) (02/15/23 1001)        Invasive Devices     None                 Physical Exam  Vitals reviewed  Constitutional:       General: She is not in acute distress  Appearance: She is well-developed  She is not diaphoretic  HENT:      Head: Normocephalic and atraumatic  Right Ear: External ear normal       Left Ear: External ear normal       Nose: Nose normal    Eyes:      General: No scleral icterus  Right eye: No discharge  Left eye: No discharge  Conjunctiva/sclera: Conjunctivae normal    Neurological:      Mental Status: She is alert and oriented to person, place, and time  Psychiatric:         Behavior: Behavior normal          Thought Content: Thought content normal          Judgment: Judgment normal          Lab Results: I have personally reviewed pertinent lab results  Imaging: I have personally reviewed pertinent reports  UGI from 1/17/2023  FINDINGS:     The esophagus is normal in caliber  Esophageal motility is normal and emptying of contrast from the esophagus is prompt  No mucosal abnormalities although evaluation is limited with single contrast technique      Patient is status post Jorge-en-Y gastric bypass surgery      Contrast passes freely from the esophagus to the gastric pouch and through the gastrojejunostomy site without evidence of obstruction  Contrast opacifies the Jorge limb with antegrade progression of contrast   Likely end to side anastomosis with   intermittent opacification of the blind-ending portion of the Jorge limb  Gastroesophageal reflux was not observed        There is a small sliding-type hiatal hernia           Assessment/PLAN:    46 y o  female status post laparoscopic Jorge-en-Y gastric bypass performed in [facility] by Dr Carlos Issa in October of 2003  At her highest weight she was 466 lbs and after the surgery she went down to a araceli of 214 lbs within the first year  She maintained for about 5 years and then slowly regained to her current weight  She went back to [facility] and saw Dr Светлана Brewer in 2018 and was told that her anatomy was normal and offered her weight management    She felt that the program did not offer a significant advantage to her and she did not follow-up with  Recently referred to us by her primary care physician to help her lose weight  I have reviewed myself the upper GI images that were obtained and the study revealed Status post Jorge-en-Y gastric bypass surgery with a mall sliding-type hiatal hernia  No evidence of contrast reflux  I am scheduling her to have an endoscopy done to further evaluate the anatomy of her bariatric operation  She will follow up with us as scheduled after the study is done      Robby Henson MD  2/15/2023  10:04 AM

## 2023-03-07 ENCOUNTER — PREP FOR PROCEDURE (OUTPATIENT)
Dept: BARIATRICS | Facility: CLINIC | Age: 53
End: 2023-03-07

## 2023-03-07 DIAGNOSIS — Z98.84 BARIATRIC SURGERY STATUS: Primary | ICD-10-CM

## 2023-04-13 RX ORDER — SODIUM CHLORIDE 9 MG/ML
125 INJECTION, SOLUTION INTRAVENOUS CONTINUOUS
Status: CANCELLED | OUTPATIENT
Start: 2023-04-13

## 2023-04-27 NOTE — PROGRESS NOTES
[facility]  coding opportunities          Chart Reviewed number of suggestions sent to Provider: 1     Patients Insurance        Commercial Insurance: [address] [place], [place] [id number] [employer] Postcare Instructions: The HEMIGARD strips are to remain completely dry for at least 5-7 days.

## 2023-04-28 ENCOUNTER — OFFICE VISIT (OUTPATIENT)
Dept: BARIATRICS | Facility: CLINIC | Age: 53
End: 2023-04-28

## 2023-04-28 VITALS
DIASTOLIC BLOOD PRESSURE: 84 MMHG | SYSTOLIC BLOOD PRESSURE: 130 MMHG | WEIGHT: 293 LBS | HEART RATE: 78 BPM | BODY MASS INDEX: 44.41 KG/M2 | HEIGHT: 68 IN | TEMPERATURE: 97.6 F

## 2023-04-28 DIAGNOSIS — E66.01 MORBID (SEVERE) OBESITY DUE TO EXCESS CALORIES (HCC): ICD-10-CM

## 2023-04-28 DIAGNOSIS — Z98.84 BARIATRIC SURGERY STATUS: ICD-10-CM

## 2023-04-28 DIAGNOSIS — R63.5 ABNORMAL WEIGHT GAIN: ICD-10-CM

## 2023-04-28 DIAGNOSIS — Z48.815 ENCOUNTER FOR SURGICAL AFTERCARE FOLLOWING SURGERY OF DIGESTIVE SYSTEM: Primary | ICD-10-CM

## 2023-04-28 NOTE — PROGRESS NOTES
Assessment/Plan:    No problem-specific Assessment & Plan notes found for this encounter  Diagnoses and all orders for this visit:    Encounter for surgical aftercare following surgery of digestive system    Bariatric surgery status    Morbid (severe) obesity due to excess calories ([address] )    Abnormal weight gain     - Anatomy unremarkable  Refer to MWM for further management  - Discussed healthy habits with patient  Provided adrianna binder as resource  - follow up in 3 months for annual visit  Subjective:      Patient ID: Jayy Farnsworth is a 46 y o  female  Patient is s/p Jorge-en-Y gastric bypass performed in [facility] by Dr Lucrecia Hi in October of 2003      At her highest weight she was 466 lbs and after the surgery she went down to a araceli of 214 lbs within the first year  She maintained for about 5 years and then slowly regained to her current weight  She went back to [facility] and saw Dr Idalia Yeager in 2018 and was told that her anatomy was normal and offered her weight management  She felt that the program did not offer a significant advantage to her and she did not follow-up with          upper GI images that were obtained and the study revealed Status post Jorge-en-Y gastric bypass surgery with a mall sliding-type hiatal hernia  No evidence of contrast reflux      She is here for an EGD review for weight regain -     [id number] [facility]  [address] [address] [address] [facility]  [phone]        DATE OF SERVICE:  4/14/23     PHYSICIAN(S):  Attending:   Haylie Gay MD     Fellow:   No Staff Documented        INDICATION:  Bariatric surgery status     POST-OP DIAGNOSIS:  See the impression below      PREPROCEDURE:  Informed consent was obtained for the procedure, including sedation  Risks of perforation, hemorrhage, adverse drug reaction and aspiration were discussed   The patient was placed in the left lateral decubitus position      Patient was explained about the risks and benefits of the procedure  Risks including but not limited to bleeding, infection, and perforation were explained in detail  Also explained about less than 100% sensitivity with the exam and other alternatives      PROCEDURE: EGD     DETAILS OF PROCEDURE:   Patient was taken to the procedure room where a time out was performed to confirm correct patient and correct procedure  The patient underwent monitored anesthesia care, which was administered by an anesthesia professional  The patient's blood pressure, heart rate, level of consciousness, respirations and oxygen were monitored throughout the procedure  The scope was advanced to the proximal part of the jejunum  Prior to the procedure, the patient's H  Pylori status was unknown  The patient experienced no blood loss  The procedure was not difficult  The patient tolerated the procedure well  There were no apparent complications  46 y o  female status post laparoscopic Jorge-en-Y gastric bypass performed in [facility] by Dr Ethan Eugene in October of 2003      At her highest weight she was 466 lbs and after the surgery she went down to a araceil of 214 lbs within the first year  She maintained for about 5 years and then slowly regained to her current weight  She went back to [facility] and saw Dr Eusebio Shelby in 2018 and was told that her anatomy was normal and offered her weight management  She felt that the program did not offer a significant advantage to her and she did not follow-up with      Recently referred to us by her primary care physician to help her lose weight         I have reviewed myself the upper GI images that were obtained and the study revealed Status post Jorge-en-Y gastric bypass surgery with a mall sliding-type hiatal hernia   No evidence of contrast reflux      I am scheduling her to have an endoscopy done to further evaluate the anatomy of her bariatric operation       ANESTHESIA INFORMATION:  ASA: III  Anesthesia Type: "Anesthesia type not filed in the log      MEDICATIONS:  sodium chloride 0 9 % infusion 200 mL*              *From user-documented volume  (Totals for administrations occurring from 1255 to 1306 on 04/14/23)        FINDINGS:  Mild, generalized erythematous mucosa in the stomach; performed 2 cold forceps biopsies to rule out H  pylori  Small sliding hiatal hernia (type I hiatal hernia) without Artice Colleen lesions present - GE junction 35 cm from the incisors        SPECIMENS:  ID Type Source Tests Collected by Time Destination  1 : r/o h pylori Tissue Stomach TISSUE Dylon Anthony MD 4/14/2023 1303             IMPRESSION:  Mild pouchitis with a small gastric pouch  No evidence of marginal ulceration, anastomotic stricture or gastrogastric fistula  Small sliding hiatal hernia with no evidence of esophagitis     RECOMMENDATION:  Continue with the bariatric program process and follow up in the office  Biopsy results pending  Tissue Exam -   Final Diagnosis  A  Stomach, biopsy  -Gastric transitional mucosa with chronic inactive gastritis  -Negative for Helicobacter pylori organisms on H&E stain  The following portions of the patient's history were reviewed and updated as appropriate: allergies, current medications, past family history, past medical history, past social history, past surgical history and problem list     Review of Systems   Constitutional: Positive for unexpected weight change  Respiratory: Negative  Cardiovascular: Negative  Gastrointestinal: Negative  Objective:      /84   Pulse 78   Temp 97 6 °F (36 4 °C) (Tympanic)   Ht 5' 8\" (1 727 m)   Wt (!) 164 kg (361 lb)   BMI 54 89 kg/m²          Physical Exam  Vitals and nursing note reviewed  Constitutional:       Appearance: Normal appearance  She is obese  Cardiovascular:      Rate and Rhythm: Normal rate and regular rhythm  Pulses: Normal pulses  Heart sounds: Normal heart sounds     Pulmonary: " Effort: Pulmonary effort is normal       Breath sounds: Normal breath sounds  Abdominal:      General: Bowel sounds are normal       Palpations: Abdomen is soft  Tenderness: There is no abdominal tenderness  Musculoskeletal:         General: Normal range of motion  Skin:     General: Skin is warm and dry  Neurological:      General: No focal deficit present  Mental Status: She is alert and oriented to person, place, and time  Psychiatric:         Mood and Affect: Mood normal          Behavior: Behavior normal          Thought Content:  Thought content normal          Judgment: Judgment normal

## 2023-05-15 ENCOUNTER — HOSPITAL ENCOUNTER (OUTPATIENT)
Dept: MAMMOGRAPHY | Facility: MEDICAL CENTER | Age: 53
Discharge: HOME | End: 2023-05-15

## 2023-05-15 VITALS — WEIGHT: 293 LBS | BODY MASS INDEX: 44.41 KG/M2 | HEIGHT: 68 IN

## 2023-05-15 DIAGNOSIS — Z12.31 ENCOUNTER FOR SCREENING MAMMOGRAM FOR MALIGNANT NEOPLASM OF BREAST: ICD-10-CM

## 2023-08-09 ENCOUNTER — OFFICE VISIT (OUTPATIENT)
Dept: BARIATRICS | Facility: CLINIC | Age: 53
End: 2023-08-09
Payer: COMMERCIAL

## 2023-08-09 VITALS
DIASTOLIC BLOOD PRESSURE: 80 MMHG | HEIGHT: 68 IN | HEART RATE: 76 BPM | WEIGHT: 293 LBS | RESPIRATION RATE: 16 BRPM | BODY MASS INDEX: 44.41 KG/M2 | SYSTOLIC BLOOD PRESSURE: 126 MMHG

## 2023-08-09 DIAGNOSIS — E66.01 CLASS 3 SEVERE OBESITY DUE TO EXCESS CALORIES WITH BODY MASS INDEX (BMI) OF 50.0 TO 59.9 IN ADULT (HCC): Primary | ICD-10-CM

## 2023-08-09 DIAGNOSIS — Z98.84 STATUS POST BARIATRIC SURGERY: ICD-10-CM

## 2023-08-09 PROBLEM — E66.813 CLASS 3 SEVERE OBESITY DUE TO EXCESS CALORIES WITH BODY MASS INDEX (BMI) OF 50.0 TO 59.9 IN ADULT (HCC): Status: ACTIVE | Noted: 2023-02-15

## 2023-08-09 PROCEDURE — 99214 OFFICE O/P EST MOD 30 MIN: CPT | Performed by: NURSE PRACTITIONER

## 2023-08-09 NOTE — PROGRESS NOTES
Assessment/Plan:    Class 3 severe obesity due to excess calories with body mass index (BMI) of 50.0 to 59.9 in adult [facility])  S/P Jorge-en-Y gastric bypass performed in [facility] by Dr. Gloria Lerner in October 2003. - Discussed options of HealthyCORE-Intensive Lifestyle Intervention Program, Very Low Calorie Diet-VLCD and Conservative Program and the role of weight loss medications. - Explained the importance of lifestyle changes with anti-obesity medications. - Patient is interested in pursuing Conservative Program  - Initial weight loss goal of 5-10% weight loss for improved health  - Weight loss can improve patient's co-morbid conditions and/or prevent weight-related complications. - Postmenopausal.  - Struggling with appetite and cravings and interested in weight loss medications to assist with that. - FDA approved weight loss medications reviewed: Wegovy, Saxenda, Qsymia, Contrave, and Phentermine.   - Not a candidate for GLP-1 therapy as maternal grandmother had history of thyroid cancer and the type if unknown. She passed away while undergoing radiation treatment for the cancer.   - Denies history of seizures, kidney stones, or glaucoma. - She will check coverage of the remaining medications and update me. - Will obtain blood work prior to starting medication. Goals:  Do not skip meals. Food log (ie.) www.[employer]am[employer].com,statusboom,[employer],calorieking. com,etc. baritastic (use Hungerstation.com, dietdoctor. com or smartphone shikha Scholrly for recipes)  No sugary beverages. At least 64oz of water daily. Increase physical activity by 10 minutes daily. Gradually increase physical activity to a goal of 5 days per week for 30 minutes of MODERATE intensity PLUS 2 days per week of FULL BODY resistance training (use smartphone apps XunLight, Home Workout, etc.)  Start food logging, weighing and measuring food. 2000 calories per day.    Gradually increase water intake to goal of 64 oz daily.   Start upper body exercises or gradually increase walking as tolerated. Try to reduce sugary beverages. Follow the 30/60 rule. Status post bariatric surgery  S/P Quintin-en-Y gastric bypass performed in [facility] by Dr. Latia Philip in October 2003. Livan Glasgow was seen today for consult. Diagnoses and all orders for this visit:    Class 3 severe obesity due to excess calories with body mass index (BMI) of 50.0 to 59.9 in adult [facility])    Status post bariatric surgery              Follow up in approximately 2 month nurse visit and 4 months with Non-Surgical Physician/Advanced Practitioner. Subjective:   Chief Complaint   Patient presents with   • Consult     MWM post-op wt gain; waist 56in; goal wt 200       Patient ID: Rigo Salinas  is a 46 y.o. female with excess weight/obesity here to pursue weight management. Previous notes and records have been reviewed.     Past Medical History:   Diagnosis Date   • History of DVT in adulthood     RLE -likely secondary to birth control medication   • Hx of gastric bypass     2003   • Iron deficiency anemia    • Melanoma ([address])     left cheek   • Morbid obesity (HCC)    • Vitamin B 12 deficiency    • Wears contact lenses      Past Surgical History:   Procedure Laterality Date   • ADENOIDECTOMY     • COLONOSCOPY     • ESOPHAGOGASTRODUODENOSCOPY     • FACIAL/NECK BIOPSY Left 11/27/2019    Procedure: CHEEK MELANOMA EXCISION WITH RECONSTRUCTION;  Surgeon: Sarmad Herrera MD;  Location: [facility];  Service: Plastics   • QUINTIN-EN-Y PROCEDURE         HPI:  Wt Readings from Last 20 Encounters:   08/09/23 (!) 165 kg (364 lb 12.8 oz)   05/15/23 (!) 164 kg (361 lb 8.9 oz)   04/28/23 (!) 164 kg (361 lb)   04/14/23 (!) 159 kg (350 lb)   02/15/23 (!) 165 kg (363 lb 8 oz)   01/11/23 (!) 162 kg (357 lb)   11/15/22 (!) 162 kg (357 lb)   11/14/22 (!) 161 kg (354 lb)   10/20/22 (!) 161 kg (355 lb)   08/25/22 (!) 164 kg (361 lb)   08/11/22 (!) 164 kg (362 lb 3.2 oz)   22 (!) 155 kg (341 lb)   10/14/21 (!) 152 kg (335 lb)   10/05/21 (!) 152 kg (335 lb)   10/01/21 (!) 150 kg (331 lb 3.2 oz)   21 (!) 152 kg (335 lb)   21 (!) 166 kg (366 lb)   20 (!) 166 kg (366 lb)   19 (!) 154 kg (340 lb)   19 (!) 166 kg (367 lb)     S/P Jorge-en-Y gastric bypass performed in [facility] by Dr. Lisa Mccarty in 2003. Highest weight prior to surgery was about 466 lbs and araceli was 220 lbs. Has been struggling with weight regain since around . Following with out surgical program. Referred for [facility]. Doesn't follow 30/60 rule. Feels hungry all the time. Cravings for sugary foods. Hydration: minimal water, 1 large coffee with cream and sugar, 16 oz tea with sugar, 2 cans diet soda daily  Alcohol: none  Smoking: denies  Exercise: nothing currently  Occupation:    Sleep: 7-8.5 hours    Current weight: 364.8 lbs BMI 55.47  Goal weight: 200 lbs    Colonoscopy: last one done about 4 years ago. Mammogram: UTD, due May 2024    The following portions of the patient's history were reviewed and updated as appropriate: allergies, current medications, past family history, past medical history, past social history, past surgical history, and problem list.    Family History   Problem Relation Age of Onset   • Hypertension Mother    • Fibroids Mother    • Leukemia Father 71   • Thyroid cancer Maternal Grandmother 36        unknown type of thyroid cancer   • No Known Problems Maternal Grandfather    • No Known Problems Paternal Grandmother    • No Known Problems Paternal Grandfather         Review of Systems   HENT: Negative for sore throat. Respiratory: Negative for cough and shortness of breath. Cardiovascular: Negative for chest pain and palpitations. Gastrointestinal: Negative for abdominal pain, constipation, diarrhea, nausea and vomiting.         Denies GERD   Musculoskeletal: Positive for arthralgias (chronic) and back pain (chronic). Skin: Negative for rash. Psychiatric/Behavioral: Negative for suicidal ideas (or HI). + depression and anxiety situational        Objective:  /80   Pulse 76   Resp 16   Ht 5' 8" (1.727 m)   Wt (!) 165 kg (364 lb 12.8 oz)   BMI 55.47 kg/m²     Physical Exam  Vitals and nursing note reviewed. Constitutional   General appearance: Abnormal.  well developed and morbidly obese. Eyes No conjunctival injection. Ears, Nose, Mouth, and Throat Oral mucosa moist.   Pulmonary   Respiratory effort: No increased work of breathing or signs of respiratory distress. Cardiovascular    Examination of extremities for edema and/or varicosities: Normal.  no edema. Abdomen   Abdomen: Abnormal.  The abdomen was obese.     Musculoskeletal   Normal range of motion  Neurological   Gait and station: Normal.   Psychiatric   Orientation to person, place and time: Normal.    Affect: appropriate

## 2023-08-10 NOTE — ASSESSMENT & PLAN NOTE
S/P Jorge-en-Y gastric bypass performed in [facility] by Dr. Consuelo Blackwood in October 2003. - Discussed options of HealthyCORE-Intensive Lifestyle Intervention Program, Very Low Calorie Diet-VLCD and Conservative Program and the role of weight loss medications. - Explained the importance of lifestyle changes with anti-obesity medications. - Patient is interested in pursuing Conservative Program  - Initial weight loss goal of 5-10% weight loss for improved health  - Weight loss can improve patient's co-morbid conditions and/or prevent weight-related complications. - Postmenopausal.  - Struggling with appetite and cravings and interested in weight loss medications to assist with that. - FDA approved weight loss medications reviewed: Wegovy, Saxenda, Qsymia, Contrave, and Phentermine.   - Not a candidate for GLP-1 therapy as maternal grandmother had history of thyroid cancer and the type if unknown. She passed away while undergoing radiation treatment for the cancer.   - Denies history of seizures, kidney stones, or glaucoma. - She will check coverage of the remaining medications and update me. - Will obtain blood work prior to starting medication. Goals:  Do not skip meals. Food log (ie.) www.Pivotshare.com,Siving Egil Kvaleberg,[employer],calorieking. com,etc. baritastic (use [employer], dietdoctor. com or smartphone shikha Insurity for recipes)  No sugary beverages. At least 64oz of water daily. Increase physical activity by 10 minutes daily. Gradually increase physical activity to a goal of 5 days per week for 30 minutes of MODERATE intensity PLUS 2 days per week of FULL BODY resistance training (use smartphone apps ALCOHOOT, Home Workout, etc.)  Start food logging, weighing and measuring food. 2000 calories per day. Gradually increase water intake to goal of 64 oz daily. Start upper body exercises or gradually increase walking as tolerated. Try to reduce sugary beverages.   Follow the 30/60 rule.

## 2023-08-10 NOTE — ASSESSMENT & PLAN NOTE
S/P Jorge-en-Y gastric bypass performed in [facility] by Dr. Poornima Mcclain in October 2003.

## 2023-08-25 ENCOUNTER — PATIENT MESSAGE (OUTPATIENT)
Dept: BARIATRICS | Facility: CLINIC | Age: 53
End: 2023-08-25

## 2023-08-25 DIAGNOSIS — E66.01 CLASS 3 SEVERE OBESITY DUE TO EXCESS CALORIES WITH BODY MASS INDEX (BMI) OF 50.0 TO 59.9 IN ADULT (HCC): Primary | ICD-10-CM

## 2023-08-25 DIAGNOSIS — Z98.84 S/P GASTRIC BYPASS: ICD-10-CM

## 2023-09-01 NOTE — TELEPHONE ENCOUNTER
Radhaethan Pepeor 9/1/2023 11:53 AM EDT      ----- Message -----  From: Viky Gutierres  Sent: 9/1/2023 11:22 AM EDT  To: *  Subject: FW: need name of meds you wanted me to check       ----- Message -----  From: Christin Sukhwinder  Sent: 9/1/2023 11:19 AM EDT  To: *  Subject: need name of meds you wanted me to check     Good morning. Okay I have checked with my insurance and they weren't really giving me a straight answer. They said have the doctor prescribe it and the pharmacy will process it and let me know what coverage is offered. They also said it depends on whether generics are used. I have [employer]. So do you want to just go ahead and prescribe those or some combination of those that you feel is most likely to be covered by insurance and then I can try? I'm really anxious to get this moving because I was referred by my family doctor to Weight Management nearly a year ago and it's taken this long until I got to see you. I know your office is very backed up. My Pharmacy is [facility] on [address]. Or if it needs to be discussed further you could call me on my cell which is [phone]. I know that the medications may not work at all or they may just work to help a little bit with curbing appetite. I'm willing to try anything!

## 2023-09-05 RX ORDER — BUPROPION HYDROCHLORIDE 100 MG/1
TABLET, EXTENDED RELEASE ORAL
Qty: 60 TABLET | Refills: 2 | Status: SHIPPED | OUTPATIENT
Start: 2023-09-05

## 2023-11-29 ENCOUNTER — TELEPHONE (OUTPATIENT)
Dept: OTHER | Facility: OTHER | Age: 53
End: 2023-11-29

## 2023-11-29 NOTE — TELEPHONE ENCOUNTER
Patient is calling regarding cancelling an appointment.     Date/Time: 11/29/2023 9:40 am    Patient was rescheduled: YES [] NO [x]    Patient requesting call back to reschedule: YES [x] NO []

## 2023-11-30 NOTE — TELEPHONE ENCOUNTER
Done patient re- scheduled her appointment with Dr Wagner Vigil to 1/8/2024 per patient she would only like to see dr Wagner Vigil

## 2023-12-13 ENCOUNTER — TELEPHONE (OUTPATIENT)
Dept: BARIATRICS | Facility: CLINIC | Age: 53
End: 2023-12-13

## 2023-12-13 ENCOUNTER — TELEPHONE (OUTPATIENT)
Dept: OTHER | Facility: OTHER | Age: 53
End: 2023-12-13

## 2023-12-13 NOTE — TELEPHONE ENCOUNTER
Patient is calling regarding cancelling an appointment.     Date/Time: 12/13/2023    Patient was rescheduled: YES [] NO [x]    Patient requesting call back to reschedule: YES [x] NO []

## 2024-01-03 ENCOUNTER — ANNUAL EXAM (OUTPATIENT)
Dept: OBGYN CLINIC | Facility: CLINIC | Age: 54
End: 2024-01-03
Payer: COMMERCIAL

## 2024-01-03 VITALS
HEIGHT: 67 IN | WEIGHT: 293 LBS | SYSTOLIC BLOOD PRESSURE: 140 MMHG | BODY MASS INDEX: 45.99 KG/M2 | DIASTOLIC BLOOD PRESSURE: 86 MMHG

## 2024-01-03 DIAGNOSIS — L91.8 SKIN TAG: ICD-10-CM

## 2024-01-03 DIAGNOSIS — Z12.31 ENCOUNTER FOR SCREENING MAMMOGRAM FOR MALIGNANT NEOPLASM OF BREAST: ICD-10-CM

## 2024-01-03 DIAGNOSIS — N89.8 VAGINAL DRYNESS: ICD-10-CM

## 2024-01-03 DIAGNOSIS — Z01.419 ROUTINE GYNECOLOGICAL EXAMINATION: Primary | ICD-10-CM

## 2024-01-03 PROCEDURE — S0612 ANNUAL GYNECOLOGICAL EXAMINA: HCPCS | Performed by: PHYSICIAN ASSISTANT

## 2024-01-03 NOTE — PROGRESS NOTES
Assessment   53 y.o. postmenopausal female presenting for annual exam.     Plan   Diagnoses and all orders for this visit:    Routine gynecological examination  Normal findings on routine exam.   considerations reviewed. Aware of sx to report.   Breast awareness/SBE encouraged.  Encouraged 150 min of exercise per week.  Reviewed balanced diet.   Vitamin D and Calcium supplement recommended.     Encounter for screening mammogram for malignant neoplasm of breast  -     Mammo screening bilateral w 3d & cad; Future    Skin tag    Vaginal dryness   We reviewed various tx options to include use of a silicone based lubricant or coconut oil during intercourse. Discussed vaginal moisturizers like Replens, hyaluronic acid suppository, and topical estrogen cream as more of a maintenance or preventative. Pt is interested in trying OTC measures and will schedule f/u if not relieved with this.     Pap - due 2025  Mammo slip given   Colonoscopy due 6/2024      RTO one year for yearly exam or sooner as needed.      __________________________________________________________________      Subjective     Alexus Pino is a 53 y.o. postmenopausal female presenting for annual exam.     Working with weight management to improve diet. Not yet exercising but knows she needs to.  Son has recently started to show mimicking of her eating habits/snacking. Feels guilt over this and knows she needs to help to change this.   Recently got back from [place] - had a great vacation.  Following with derm q 6 months due to hx of melanoma.     SCREENING  Last Pap: 09/29/2020 NILM/hpv neg  Last Mammo: 05/15/2023  BIRADS 1 - Negative  Last Colonoscopy: 06/11/2019 - 5 year recall    GYN  Now  - age 52    Denies postmenopausal vaginal bleeding, dryness, vaginal discharge, itching, odor, pelvic pain and vulvar/vaginal symptoms    Sexually active: Yes - single partner -   Concerns: dryness- no tx tried. Options reviewed. denies pain,  bleeding    Hx Abnormal pap: denies  We reviewed ASCCP guidelines for Pap testing today.     OB        Complaints: denies  Denies leakage/difficulty urinating, dysuria, hematuria, and urinary frequency/urgency    BREAST  Complaints: denies  Denies: breast lump, breast tenderness, dryness, nipple discharge, pruritus, skin color change, and skin lesion(s)    Pertinent Family Hx:   Family hx of breast cancer: no  Family hx of ovarian cancer: no  Family hx of colon cancer: no      Past Medical History:   Diagnosis Date    History of DVT in adulthood     RLE -likely secondary to birth control medication    Hx of gastric bypass         Iron deficiency anemia     Melanoma (HCC)     left cheek    Morbid obesity (HCC)     Vitamin B 12 deficiency     Wears contact lenses        Past Surgical History:   Procedure Laterality Date    ADENOIDECTOMY      COLONOSCOPY      ESOPHAGOGASTRODUODENOSCOPY      FACIAL/NECK BIOPSY Left 2019    Procedure: CHEEK MELANOMA EXCISION WITH RECONSTRUCTION;  Surgeon: Steven Jameson MD;  Location: [facility] OR;  Service: Plastics    QUITNIN-EN-Y PROCEDURE           Current Outpatient Medications:     Biotin 1 MG CAPS, Take by mouth, Disp: , Rfl:     Calcium Ascorbate (VITAMIN C) 500 mg tablet, Take 500 mg by mouth daily, Disp: , Rfl:     CALCIUM CITRATE-VITAMIN D PO, Take by mouth, Disp: , Rfl:     CALCIUM PO, Take by mouth daily, Disp: , Rfl:     magnesium 30 MG tablet, Take 1 tablet (30 mg total) by mouth in the morning, Disp: 30 tablet, Rfl: 0    mometasone (ELOCON) 0.1 % lotion, 4 drops each ear once weekly at bedtime, Disp: 60 mL, Rfl: 2    Multiple Vitamins-Minerals (multivitamin with minerals) tablet, Take 1 tablet by mouth daily, Disp: , Rfl:     ofloxacin (OCUFLOX) 0.3 % ophthalmic solution, 5 drops left ear BID for 7 days, Disp: 10 mL, Rfl: 1    Omega-3 Fatty Acids (FISH OIL PO), Take by mouth, Disp: , Rfl:     vitamin B-12 (VITAMIN B-12) 1,000 mcg tablet, Take by mouth daily,  "Disp: , Rfl:     Allergies   Allergen Reactions    Iodine - Food Allergy GI Intolerance    Other Rash     Antibiotic - unsure of name/ and also has allergy to hair dye- scalp bleeding       Social History     Socioeconomic History    Marital status: /Civil Union     Spouse name: Not on file    Number of children: Not on file    Years of education: Not on file    Highest education level: Not on file   Occupational History    Not on file   Tobacco Use    Smoking status: Never    Smokeless tobacco: Never   Vaping Use    Vaping status: Never Used   Substance and Sexual Activity    Alcohol use: No    Drug use: No    Sexual activity: Yes     Partners: Male     Birth control/protection: Post-menopausal   Other Topics Concern    Not on file   Social History Narrative    CONSUMES 5 CUPS, 1 CUP OF TEA AND 1 SODA PER DAY     Social Determinants of Health     Financial Resource Strain: Not on file   Food Insecurity: Not on file   Transportation Needs: Not on file   Physical Activity: Not on file   Stress: Not on file   Social Connections: Not on file   Intimate Partner Violence: Not on file   Housing Stability: Not on file         Review of Systems   Constitutional: Negative.   Respiratory: Negative.    Cardiovascular: Negative   Gastrointestinal: Negative   Breasts: As noted above.   Genitourinary: As noted above.   Psychiatric: Negative       Objective      /86 (BP Location: Left arm, Patient Position: Sitting, Cuff Size: Large)   Ht 5' 7\" (1.702 m)   Wt (!) 163 kg (358 lb 9.6 oz)   BMI 56.16 kg/m²     Physical Examination:  Patient appears well and is not in distress  Breasts are symmetrical without mass, tenderness, nipple discharge, skin changes or adenopathy.   Abdomen is soft and nontender without masses.   External genitals- two small skin tags of the left inguinal fold, otherwise normal otherwise without lesions or rashes.  Urethral meatus and urethra are normal  Bladder is normal to palpation  Vagina " is normal without discharge or bleeding.   Cervix is normal without discharge or lesion.   Uterus is normal, mobile, nontender without palpable mass.  Adnexa are normal, nontender, without palpable mass.

## 2024-01-03 NOTE — PATIENT INSTRUCTIONS
Vaginal moisturizers (prevention)     Coconut oil (organic, pure, unscented) as needed for moisture or lubrication.   Revaree hyaluronic acid suppository   Replens moisture restore external comfort gel daily ( use as directed on the box)    Replens long lasting vaginal moisturizer  ( use as directed on the box)       Vaginal Lubricants (during intercourse):     Coconut oil (Do not use if allergic)          Silicone based lubricant:  Uber Lube  AstroGlide  Replens silky smooth lubricant, premium silicone based lubricant for intercourse. ( use as directed, a small amount will provide an enhanced natural feeling)

## 2024-01-08 ENCOUNTER — OFFICE VISIT (OUTPATIENT)
Dept: FAMILY MEDICINE CLINIC | Facility: OTHER | Age: 54
End: 2024-01-08
Payer: COMMERCIAL

## 2024-01-08 ENCOUNTER — TELEPHONE (OUTPATIENT)
Dept: ADMINISTRATIVE | Facility: OTHER | Age: 54
End: 2024-01-08

## 2024-01-08 ENCOUNTER — TELEPHONE (OUTPATIENT)
Dept: FAMILY MEDICINE CLINIC | Facility: OTHER | Age: 54
End: 2024-01-08

## 2024-01-08 VITALS
OXYGEN SATURATION: 100 % | BODY MASS INDEX: 44.41 KG/M2 | WEIGHT: 293 LBS | TEMPERATURE: 97.5 F | SYSTOLIC BLOOD PRESSURE: 116 MMHG | DIASTOLIC BLOOD PRESSURE: 86 MMHG | RESPIRATION RATE: 17 BRPM | HEIGHT: 68 IN | HEART RATE: 74 BPM

## 2024-01-08 DIAGNOSIS — Z98.84 STATUS POST BARIATRIC SURGERY: ICD-10-CM

## 2024-01-08 DIAGNOSIS — E66.01 MORBID OBESITY (HCC): ICD-10-CM

## 2024-01-08 DIAGNOSIS — Z13.6 SCREENING FOR CARDIOVASCULAR CONDITION: ICD-10-CM

## 2024-01-08 DIAGNOSIS — Z12.11 COLON CANCER SCREENING: ICD-10-CM

## 2024-01-08 DIAGNOSIS — E21.3 HYPERPARATHYROIDISM (HCC): ICD-10-CM

## 2024-01-08 DIAGNOSIS — D03.39 MELANOMA IN SITU OF CHEEK (HCC): ICD-10-CM

## 2024-01-08 DIAGNOSIS — Z00.00 ANNUAL PHYSICAL EXAM: Primary | ICD-10-CM

## 2024-01-08 DIAGNOSIS — Z13.1 SCREENING FOR DIABETES MELLITUS: ICD-10-CM

## 2024-01-08 DIAGNOSIS — R06.83 SNORING: ICD-10-CM

## 2024-01-08 PROCEDURE — 99396 PREV VISIT EST AGE 40-64: CPT | Performed by: FAMILY MEDICINE

## 2024-01-08 NOTE — PROGRESS NOTES
ADULT ANNUAL PHYSICAL  [facility]    NAME: Alexus Pino  AGE: 53 y.o. SEX: female  : 1970     DATE: 2024     Assessment and Plan:     Problem List Items Addressed This Visit       Morbid obesity (HCC)    Relevant Orders    Ambulatory Referral to Sleep Medicine    Status post bariatric surgery    Relevant Orders    CBC    Iron    Vitamin D 25 hydroxy    Vitamin B12    Ferritin    Vitamin B1, whole blood    PTH, intact    Vitamin A    Folate    Lipid Panel with Direct LDL reflex    Comprehensive metabolic panel    TSH + Free T4    Ambulatory Referral to Sleep Medicine    Melanoma in situ of cheek (HCC)     Follows every 6 months with Derm ([facility] [place])         Hyperparathyroidism (HCC)     Seen in  by WILLIAM Mcgraw -- believe PTH elevation was due to poor calcium in diet and possible malabsorption from gastric bypass.    Continue to follow PTH value and if rises again, will recommend re-consulting Endocrinology.         Colon cancer screening    Relevant Orders    Ambulatory referral to Gastroenterology     Other Visit Diagnoses       Annual physical exam    -  Primary    Screening for diabetes mellitus        Relevant Orders    Comprehensive metabolic panel    Screening for cardiovascular condition        Relevant Orders    Lipid Panel with Direct LDL reflex    Snoring        Relevant Orders    Ambulatory Referral to Sleep Medicine            Immunizations and preventive care screenings were discussed with patient today. Appropriate education was printed on patient's after visit summary.    Counseling:  Alcohol/drug use: discussed moderation in alcohol intake, the recommendations for healthy alcohol use, and avoidance of illicit drug use.  Dental Health: discussed importance of regular tooth brushing, flossing, and dental visits.  Injury prevention: discussed safety/seat belts, safety helmets, smoke detectors, carbon  dioxide detectors, and smoking near bedding or upholstery.  Sexual health: discussed sexually transmitted diseases, partner selection, use of condoms, avoidance of unintended pregnancy, and contraceptive alternatives.  Exercise: the importance of regular exercise/physical activity was discussed. Recommend exercise 3-5 times per week for at least 30 minutes.       Depression Screening and Follow-up Plan: Patient was screened for depression during today's encounter. They screened negative with a PHQ-2 score of 0.        Return in about 1 year (around 1/8/2025) for Annual physical.     Chief Complaint:     Chief Complaint   Patient presents with    Physical Exam     Annual blood work       History of Present Illness:     Adult Annual Physical   Patient here for a comprehensive physical exam. The patient reports no problems.    Diet and Physical Activity  Diet/Nutrition: poor diet and frequent junk food.   Exercise: no formal exercise.      Depression Screening  PHQ-2/9 Depression Screening    Little interest or pleasure in doing things: 0 - not at all  Feeling down, depressed, or hopeless: 0 - not at all  PHQ-2 Score: 0  PHQ-2 Interpretation: Negative depression screen       General Health  Sleep: sleeps well, gets 7-8 hours of sleep on average, and snores loudly.   Hearing: normal - bilateral.  Vision: goes for regular eye exams.   Dental: no dental visits for >1 year.       /GYN Health  Follows with gynecology? yes   Patient is: postmenopausal  Last menstrual period: years  Contraceptive method: menopause.    Advanced Care Planning  Do you have an advanced directive? no  Do you have a durable medical power of ? no     Review of Systems:     Review of Systems   Constitutional:  Negative for appetite change, fatigue, fever and unexpected weight change.   HENT:  Negative for congestion, dental problem, ear pain, postnasal drip, sore throat and tinnitus.    Eyes:  Negative for pain, discharge and visual  disturbance.   Respiratory:  Negative for cough, shortness of breath and wheezing.    Cardiovascular:  Negative for chest pain, palpitations and leg swelling.   Gastrointestinal:  Negative for abdominal pain, constipation, diarrhea, nausea and vomiting.   Endocrine: Negative for cold intolerance and heat intolerance.   Genitourinary:  Negative for difficulty urinating, dysuria, flank pain and urgency.   Musculoskeletal:  Negative for arthralgias, back pain, joint swelling and myalgias.   Skin:  Negative for rash and wound.   Allergic/Immunologic: Negative for immunocompromised state.   Neurological:  Negative for dizziness, syncope, speech difficulty, weakness and numbness.   Hematological:  Negative for adenopathy. Does not bruise/bleed easily.   Psychiatric/Behavioral:  Negative for confusion, dysphoric mood and sleep disturbance. The patient is not nervous/anxious.       Past Medical History:     Past Medical History:   Diagnosis Date    History of DVT in adulthood     RLE -likely secondary to birth control medication    Hx of gastric bypass     2003    Iron deficiency anemia     Melanoma (HCC)     left cheek    Morbid obesity (HCC)     Vitamin B 12 deficiency     Wears contact lenses       Past Surgical History:     Past Surgical History:   Procedure Laterality Date    ADENOIDECTOMY      COLONOSCOPY      ESOPHAGOGASTRODUODENOSCOPY      FACIAL/NECK BIOPSY Left 11/27/2019    Procedure: CHEEK MELANOMA EXCISION WITH RECONSTRUCTION;  Surgeon: Steven Jameson MD;  Location: [facility];  Service: Plastics    QUINTIN-EN-Y PROCEDURE        Social History:     Social History     Socioeconomic History    Marital status: /Civil Union     Spouse name: None    Number of children: None    Years of education: None    Highest education level: None   Occupational History    None   Tobacco Use    Smoking status: Never    Smokeless tobacco: Never   Vaping Use    Vaping status: Never Used   Substance and Sexual Activity    Alcohol  use: No    Drug use: No    Sexual activity: Yes     Partners: Male     Birth control/protection: Post-menopausal   Other Topics Concern    None   Social History Narrative    CONSUMES 5 CUPS, 1 CUP OF TEA AND 1 SODA PER DAY     Social Determinants of Health     Financial Resource Strain: Not on file   Food Insecurity: Not on file   Transportation Needs: Not on file   Physical Activity: Not on file   Stress: Not on file   Social Connections: Not on file   Intimate Partner Violence: Not on file   Housing Stability: Not on file      Family History:     Family History   Problem Relation Age of Onset    Hypertension Mother     Fibroids Mother     Leukemia Father 69    Thyroid cancer Maternal Grandmother 40        unknown type of thyroid cancer    No Known Problems Maternal Grandfather     No Known Problems Paternal Grandmother     No Known Problems Paternal Grandfather       Current Medications:     Current Outpatient Medications   Medication Sig Dispense Refill    Biotin 1 MG CAPS Take by mouth      Calcium Ascorbate (VITAMIN C) 500 mg tablet Take 500 mg by mouth daily      CALCIUM CITRATE-VITAMIN D PO Take by mouth      CALCIUM PO Take by mouth daily      magnesium 30 MG tablet Take 1 tablet (30 mg total) by mouth in the morning 30 tablet 0    mometasone (ELOCON) 0.1 % lotion 4 drops each ear once weekly at bedtime 60 mL 2    Multiple Vitamins-Minerals (multivitamin with minerals) tablet Take 1 tablet by mouth daily      ofloxacin (OCUFLOX) 0.3 % ophthalmic solution 5 drops left ear BID for 7 days 10 mL 1    Omega-3 Fatty Acids (FISH OIL PO) Take by mouth      vitamin B-12 (VITAMIN B-12) 1,000 mcg tablet Take by mouth daily       No current facility-administered medications for this visit.      Allergies:     Allergies   Allergen Reactions    Iodine - Food Allergy GI Intolerance    Other Rash     Antibiotic - unsure of name/ and also has allergy to hair dye- scalp bleeding      Physical Exam:     /86   Pulse 74   " Temp 97.5 °F (36.4 °C)   Resp 17   Ht 5' 7.5\" (1.715 m)   Wt (!) 157 kg (347 lb)   SpO2 100%   BMI 53.55 kg/m²     Physical Exam  Vitals and nursing note reviewed.   Constitutional:       General: She is not in acute distress.     Appearance: Normal appearance. She is well-developed. She is not ill-appearing.      Comments: Body mass index is 53.55 kg/m².    HENT:      Head: Normocephalic and atraumatic.      Right Ear: Hearing, tympanic membrane, ear canal and external ear normal.      Left Ear: Hearing, tympanic membrane, ear canal and external ear normal.      Nose: Nose normal.      Mouth/Throat:      Mouth: Mucous membranes are moist.      Pharynx: Oropharynx is clear. Uvula midline.   Eyes:      General: No scleral icterus.     Conjunctiva/sclera: Conjunctivae normal.      Pupils: Pupils are equal, round, and reactive to light.   Neck:      Thyroid: No thyromegaly.   Cardiovascular:      Rate and Rhythm: Normal rate and regular rhythm.      Heart sounds: Normal heart sounds. No murmur heard.  Pulmonary:      Effort: Pulmonary effort is normal. No respiratory distress.      Breath sounds: Normal breath sounds. No wheezing.   Abdominal:      General: Bowel sounds are normal. There is no distension.      Palpations: Abdomen is soft.      Tenderness: There is no abdominal tenderness.   Musculoskeletal:         General: No tenderness. Normal range of motion.      Cervical back: Normal range of motion and neck supple.      Right lower leg: Edema (1+ lymphedema) present.      Left lower leg: Edema (1+ lymphedema) present.   Lymphadenopathy:      Cervical: No cervical adenopathy.   Skin:     General: Skin is warm and dry.      Coloration: Skin is not jaundiced.      Findings: No erythema or rash.   Neurological:      General: No focal deficit present.      Mental Status: She is alert and oriented to person, place, and time.      Cranial Nerves: No cranial nerve deficit.   Psychiatric:         Mood and Affect: " Mood normal.         Behavior: Behavior normal.          Margarita Milian,   [facility]

## 2024-01-08 NOTE — TELEPHONE ENCOUNTER
Upon review of the In Basket request we were able to locate, review, and update the patient chart as requested for CRC: Colonoscopy.    Any additional questions or concerns should be emailed to the Practice Liaisons via the appropriate education email address, please do not reply via In Basket.    Thank you  Ami Alicia

## 2024-01-08 NOTE — ASSESSMENT & PLAN NOTE
Seen in 2020 by WILLIAM Mcgraw -- believe PTH elevation was due to poor calcium in diet and possible malabsorption from gastric bypass.    Continue to follow PTH value and if rises again, will recommend re-consulting Endocrinology.

## 2024-01-08 NOTE — TELEPHONE ENCOUNTER
----- Message from Patrick Padilla sent at 1/8/2024 10:33 AM EST -----  Regarding: care gap request  01/08/24 10:33 AM    Hello, our patient attached above has had CRC: Colonoscopy completed/performed. Please assist in updating the patient chart by pulling the Care Everywhere (CE) document. The date of service is 06/11/2019 at [facility] Shanelle Tran MD.     Thank you,  Patrick PABLO

## 2024-01-08 NOTE — TELEPHONE ENCOUNTER
----- Message from Margarita Milian DO sent at 1/8/2024 10:15 AM EST -----  CARE GAPS:  Colonoscopy at [facility] Shanelle Tran MD 06/11/19

## 2024-01-18 ENCOUNTER — TELEPHONE (OUTPATIENT)
Dept: FAMILY MEDICINE CLINIC | Facility: OTHER | Age: 54
End: 2024-01-18

## 2024-01-18 NOTE — TELEPHONE ENCOUNTER
Hi, I'm just calling. I was in there about 10 days ago with Doctor Merritt for an annual exam and she ordered a whole array of blood tests for things. Or she said she was. But I didn't walk out with an order in hand, and I'm not sure if that's something like that's on the [facility] website or if I need to just go get my blood drawn and they can access it that way. Because I would probably be going to an independent lab, not a hospital affiliated lab. So I just didn't know. Like I didn't. I wasn't handed any type of blood test order when I walked out the door. So my full name is Erica Pino. It's Alexus and then Alvarez. And my date of birth is [birth year] for [birth year]. So I'll just leave you my cell phone. It's [phone] to [phone]. OK, thanks. Bye.

## 2024-01-24 ENCOUNTER — TELEPHONE (OUTPATIENT)
Dept: FAMILY MEDICINE CLINIC | Facility: OTHER | Age: 54
End: 2024-01-24

## 2024-01-24 NOTE — TELEPHONE ENCOUNTER
Hi, this is Erica Pino calling. I was there a couple weeks ago for an [facility] order array of blood tests for me and wasn't given the blood test script when I walked out the door. So I called there a couple days later and requested it, and somebody said that they would mail it out to me. I never got anything, so I called back there again last week. The same thing to be mailed out, man. My mission has not again. We've been moving. I still haven't seen anything. I'm calling again and and [place]. My date of birth is [birth year] and once again my mailing address has not changed. It's [id number] [address]. My cell phone number hasn't changed. It's [phone]. So this is my third call back in to try to get the blood test script mailed out to me. OK. The hours that you have or the hours that I'm working in my office is in [place]. So that's why I had requested a few times Friday that it be mailed out to me. OK, thank you. Bye, bye.

## 2024-02-16 ENCOUNTER — TELEPHONE (OUTPATIENT)
Dept: FAMILY MEDICINE CLINIC | Facility: OTHER | Age: 54
End: 2024-02-16

## 2024-02-16 DIAGNOSIS — E21.3 HYPERPARATHYROIDISM (HCC): Primary | ICD-10-CM

## 2024-02-16 NOTE — TELEPHONE ENCOUNTER
----- Message from Margarita Milian DO sent at 2/16/2024 12:40 PM EST -----  Please inform pt that labs were received and reviewed.  She is noted to have a minor decrease in Vitamin D (should take OTC D3 supplement 2000 units daily) and a slight elevation in her T4.  This is of little significance as the TSH level was normal.  Her parathyroid hormone (PTH) was markedly increased and I would like her to have a consult with Endocrinology.    Thanks!  Margarita Milian DO

## 2024-02-19 ENCOUNTER — TELEPHONE (OUTPATIENT)
Dept: ENDOCRINOLOGY | Facility: CLINIC | Age: 54
End: 2024-02-19

## 2024-02-21 PROBLEM — Z12.11 COLON CANCER SCREENING: Status: RESOLVED | Noted: 2024-01-08 | Resolved: 2024-02-21

## 2024-03-19 ENCOUNTER — TELEPHONE (OUTPATIENT)
Dept: FAMILY MEDICINE CLINIC | Facility: OTHER | Age: 54
End: 2024-03-19

## 2024-03-19 NOTE — TELEPHONE ENCOUNTER
LVMOM inviting pt to call back to discuss.    Will also send MyChart message.      Margarita Milian, DO

## 2025-01-02 ENCOUNTER — RA CDI HCC (OUTPATIENT)
Dept: OTHER | Facility: HOSPITAL | Age: 55
End: 2025-01-02

## 2025-02-03 ENCOUNTER — TELEPHONE (OUTPATIENT)
Age: 55
End: 2025-02-03

## 2025-05-09 ENCOUNTER — ANNUAL EXAM (OUTPATIENT)
Dept: OBGYN CLINIC | Facility: CLINIC | Age: 55
End: 2025-05-09
Payer: COMMERCIAL

## 2025-05-09 VITALS
WEIGHT: 293 LBS | BODY MASS INDEX: 45.99 KG/M2 | SYSTOLIC BLOOD PRESSURE: 126 MMHG | DIASTOLIC BLOOD PRESSURE: 74 MMHG | HEIGHT: 67 IN

## 2025-05-09 DIAGNOSIS — Z12.4 SCREENING FOR CERVICAL CANCER: ICD-10-CM

## 2025-05-09 DIAGNOSIS — D03.39 MELANOMA IN SITU OF CHEEK (HCC): ICD-10-CM

## 2025-05-09 DIAGNOSIS — E21.3 HYPERPARATHYROIDISM (HCC): ICD-10-CM

## 2025-05-09 DIAGNOSIS — N84.1 ENDOCERVICAL POLYP: ICD-10-CM

## 2025-05-09 DIAGNOSIS — Z12.11 COLON CANCER SCREENING: ICD-10-CM

## 2025-05-09 DIAGNOSIS — Z01.419 ENCNTR FOR GYN EXAM (GENERAL) (ROUTINE) W/O ABN FINDINGS: Primary | ICD-10-CM

## 2025-05-09 DIAGNOSIS — Z12.31 ENCOUNTER FOR SCREENING MAMMOGRAM FOR BREAST CANCER: ICD-10-CM

## 2025-05-09 DIAGNOSIS — Z11.51 SCREENING FOR HPV (HUMAN PAPILLOMAVIRUS): ICD-10-CM

## 2025-05-09 PROCEDURE — S0612 ANNUAL GYNECOLOGICAL EXAMINA: HCPCS | Performed by: PHYSICIAN ASSISTANT

## 2025-05-09 PROCEDURE — G0145 SCR C/V CYTO,THINLAYER,RESCR: HCPCS | Performed by: PHYSICIAN ASSISTANT

## 2025-05-09 PROCEDURE — G0476 HPV COMBO ASSAY CA SCREEN: HCPCS | Performed by: PHYSICIAN ASSISTANT

## 2025-05-09 NOTE — ASSESSMENT & PLAN NOTE
- Per note on problem list from 2021, elevated PTH believed to be due to low CA and Vitamin D  - PT to start on 500 mg BID of calcium and 2000 units vitamin D daily  - repeat blood work in 1 month to determine if PTH is still elevated    Orders:    Vitamin D 25 hydroxy; Future    PTH, intact; Future    DXA bone density spine hip and pelvis; Future    Calcium, urine, 24 hour; Future    Calcium, ionized; Future

## 2025-05-09 NOTE — PROGRESS NOTES
Name: Alexus Pino      : 1970      MRN: [id number]  Encounter Provider: Kita Hancock PA-C  Encounter Date: 2025   Encounter department: [facility]  :  Assessment & Plan  Encntr for gyn exam (general) (routine) w/o abn findings  - Routine well woman exam done today.  - Cervical Cancer Screening: Pap done..  Current ASCCP Guidelines reviewed.    - Mammogram: ordered.  Recommend yearly mammography per ACOG guidelines.   - Colon Cancer Screening: Discussed different methods to screen for colon cancer, including colonoscopy, FIT, and Cologuard. Pt desires Colonoscopy.  Next Colonoscopy due now  - STD testing: Patient declines STD testing today.  - Pt to return in RTC for 2-3 weeks for polyp removal .    All questions answered to the best of my ability.    Orders:    Liquid-based pap, screening    Endocervical polyp  - asymptomatic  - denies PMB or post coital bleeding  - reviewed option for monitoring verse removal  - pt desires removal. Will RTC       Screening for cervical cancer         Screening for HPV (human papillomavirus)         Encounter for screening mammogram for breast cancer    Orders:    Mammo screening bilateral w 3d and cad; Future    Colon cancer screening    Orders:    Ambulatory referral to Colorectal Surgery; Future    Melanoma in situ of cheek (HCC)  - photo taken and added to chart  - dermatology to review         Hyperparathyroidism (HCC)  - Per note on problem list from , elevated PTH believed to be due to low CA and Vitamin D  - PT to start on 500 mg BID of calcium and 2000 units vitamin D daily  - repeat blood work in 1 month to determine if PTH is still elevated    Orders:    Vitamin D 25 hydroxy; Future    PTH, intact; Future    DXA bone density spine hip and pelvis; Future    Calcium, urine, 24 hour; Future    Calcium, ionized; Future        History of Present Illness   HPI  54 y.o. Alexus Pino   presents for annual exam.    Pt has a hx of hyperparathyroidism.  Will start early DXA scan.    Pt has a hx of melanoma in-situ.  She recently saw the dermatologist in the office. She has a concerning lesion near the introitus.  Dermatologist did not look at area and recommended she see gynecologist.      Pt states she developed SOB and chest pressure after 8 weeks of taking Wegovy.  She went to Urgent Care for follow-up. She states told that EKG was normal. She stopped Wegovy and symptoms have resolved. Reviewed to see PCP if develops any additional symptoms.    Patient's last menstrual period was 2022 (approximate).    Gynecology  - Menopause:     - PMB: denies     - Sexually Active: Yes .    - STD screening desired: declines     - Last pap:  Lab Results   Component Value Date    PRIMINTERP Negative for intraepithelial lesion or malignancy 2020    SPECADGYN  2020     Satisfactory for evaluation. Endocervical/transformation zone component present.    HPVOTHERHR Negative 2020    HPV16 Negative 2020    HPV18 Negative 2020       - Hx of Abnormal paps: Yes, describe: in early 20s, she does not remember if she needed colposcopy.  Normal paps since that time.    - Hx of gynecology surgeries: Yes, describe:      Family history of any of the following cancers:  - Colon: No  - Breast: No  - Ovarian: No  - Uterine: No  - Pancreas: No  - Melanoma: unsure    Father, Brothers x 2 - hx of skin cancer, unknown if Melanoma    Personal hx of melanoma in situ     Obstetric History   OB History    Para Term  AB Living   2 1 1 0 1 1   SAB IAB Ectopic Multiple Live Births   0 0 0 0 1      # Outcome Date GA Lbr Missael/2nd Weight Sex Type Anes PTL Lv   2 AB            1 Term         COLLEEN       Breast Cancer Screenin2024   Bi-Rads 1 Negative  Density A almost entirely fatty    - Life time Estefanía Jacinto % 12.8    Colon Cancer Screening:   Colonoscopy: 2019 present,  "Return in 5 years.    Social History     Socioeconomic History    Marital status: /Civil Union     Spouse name: Not on file    Number of children: Not on file    Years of education: Not on file    Highest education level: Not on file   Occupational History    Not on file   Tobacco Use    Smoking status: Never    Smokeless tobacco: Never   Vaping Use    Vaping status: Never Used   Substance and Sexual Activity    Alcohol use: No    Drug use: No    Sexual activity: Yes     Partners: Male     Birth control/protection: Post-menopausal   Other Topics Concern    Not on file   Social History Narrative    CONSUMES 5 CUPS, 1 CUP OF TEA AND 1 SODA PER DAY     Social Drivers of Health     Financial Resource Strain: Not on file   Food Insecurity: Not on file   Transportation Needs: Not on file   Physical Activity: Not on file   Stress: Not on file   Social Connections: Not on file   Intimate Partner Violence: Not on file   Housing Stability: Not on file                    Review of Systems   Constitutional:  Negative for activity change, chills and fatigue.        Additional ROS in HPI   Cardiovascular:  Chest pain: after took Wegovy - pt advised if symptoms present again to see PCP for follow-up.   Gastrointestinal:  Negative for anal bleeding and blood in stool.   Genitourinary:  Negative for difficulty urinating, dysuria, frequency, hematuria, pelvic pain, urgency, vaginal bleeding, vaginal discharge and vaginal pain.          Objective   /74   Ht 5' 7.32\" (1.71 m)   Wt (!) 165 kg (362 lb 12.8 oz)   LMP 06/07/2022 (Approximate)   BMI 56.28 kg/m²      Physical Exam  Vitals reviewed.   Constitutional:       General: She is not in acute distress.     Appearance: Normal appearance. She is not ill-appearing or toxic-appearing.   HENT:      Head: Normocephalic and atraumatic.      Jaw: There is normal jaw occlusion.      Nose: Nose normal.      Mouth/Throat:      Lips: Pink.   Eyes:      General: Lids are normal. "      Extraocular Movements: Extraocular movements intact.   Neck:      Thyroid: No thyroid mass, thyromegaly or thyroid tenderness.      Trachea: Trachea normal. No tracheostomy or tracheal deviation.   Chest:   Breasts:     Breasts are symmetrical.      Right: Normal. No swelling, bleeding, inverted nipple, mass, nipple discharge, skin change or tenderness.      Left: Normal. No swelling, bleeding, inverted nipple, mass, nipple discharge, skin change or tenderness.   Abdominal:      General: There is no distension.      Palpations: Abdomen is soft. There is no fluid wave, hepatomegaly or splenomegaly.      Tenderness: There is no abdominal tenderness. There is no guarding or rebound.   Genitourinary:     Exam position: Lithotomy position.      Pubic Area: No rash.       Labia:         Right: No rash, tenderness, lesion or injury.         Left: No rash, tenderness, lesion or injury.       Urethra: No prolapse or urethral lesion.      Vagina: Normal.      Cervix: Lesion (endocervical polyp) present. No cervical motion tenderness.      Uterus: Normal. Not tender.       Adnexa: Right adnexa normal and left adnexa normal.        Right: No mass, tenderness or fullness.          Left: No mass, tenderness or fullness.        Comments: Approximately Fundal Size: difficult to determine 2/2 body habitus   Musculoskeletal:      Cervical back: Neck supple. No edema or erythema. Normal range of motion.   Lymphadenopathy:      Cervical: No cervical adenopathy.      Right cervical: No superficial or deep cervical adenopathy.     Left cervical: No superficial or deep cervical adenopathy.      Upper Body:      Right upper body: No supraclavicular or axillary adenopathy.      Left upper body: No supraclavicular or axillary adenopathy.   Skin:     General: Skin is cool and dry.   Neurological:      Mental Status: She is alert.   Psychiatric:         Behavior: Behavior is cooperative.

## 2025-05-14 LAB
LAB AP GYN PRIMARY INTERPRETATION: NORMAL
Lab: NORMAL

## 2025-05-16 ENCOUNTER — RESULTS FOLLOW-UP (OUTPATIENT)
Dept: OBGYN CLINIC | Facility: CLINIC | Age: 55
End: 2025-05-16

## 2025-05-30 ENCOUNTER — TELEPHONE (OUTPATIENT)
Dept: GASTROENTEROLOGY | Facility: AMBULARY SURGERY CENTER | Age: 55
End: 2025-05-30

## 2025-05-30 NOTE — LETTER
May 30, 2025    Alexus Pino  [address] [place] [id number]      Dear Ms. Pino:        COLONOSCOPY  MIRALAX/Dulcolax Bowel Preparation Instructions    The OR/GI Lab will contact you the evening prior to your procedure with your exact arrival time.    Our practice requires a 1 week notice for any cancellations or rescheduling. We kindly ask that you immediately notify us of any changes including any new medications that are prescribed. Thank you for your cooperation.     WEEK BEFORE YOUR PROCEDURE:  Stop taking Iron tablets.  5 days prior, AVOID vegetables and fruits with skins or seeds, nuts, corn, popcorn and whole grain breads.   Purchase: One (1) 238-gram container of Miralax (polyethylene glycol 3350), four (4) 5 mg Dulcolax (bisacodyl) tablets, and one (1) 64-ounce bottle of Gatorade (sports drink) - no red, orange, or purple. These may be purchased at any pharmacy without a prescription. Generic products are permissible.   Arrange responsible transportation for day of the procedure.     DAY BEFORE THE PROCEDURE:   CLEAR liquids only for entire day prior. Nothing red, orange or purple.    You MAY have:                                                               Soda  Water  Broth Gatorade  Jello  Popsicles Coffee/tea without milk/creamer     YOU MAY NOT HAVE:  Solid foods   Milk and milk products    Juice with pulp    BOWEL PREPARATION:  Includes: One (1) 238-gram container of Miralax (polyethylene glycol 3350), four (4) 5 mg Dulcolax (bisacodyl) tablets, and one (1) 64-ounce bottle of Gatorade (sports drink).  Preparation may be refrigerated.  Entire bowel prep should be completed.     Afternoon before the procedure (2:00 pm - 5:00 pm):    Take two (2) 5 mg Dulcolax laxative tablets.     Evening before the procedure (6:00 pm):  Mix entire container of Miralax with one (1) 64-ounce bottle of Gatorade and shake until all medication is dissolved.   Begin drinking solution. Drink an eight (8)  ounce cup every 10-15 minutes until you have consumed half (32 ounces) of the solution.  Refrigerate remaining solution.    Night before the procedure (8:00 pm):  Take two (2) 5 mg Dulcolax laxative tablets.     Beginning 5 hours before your procedure:  Drink the remaining amount of prepared solution (32 ounces).  Drink an eight (8) ounce cup every 10-15 minutes until you have consumed the remaining solution.     Bowel prep should be completed 4 hours prior to procedure time.    NOTHING TO EAT OR DRINK AFTER MIDNIGHT- EXCEPT FOR YOUR PREP    DAY OF THE PROCEDURE:  You may brush your teeth.  Leave all jewelry at home.  Please arrive for your procedure as indicated by the OR / GI Lab / Endoscopy Unit. The hospital will contact you the day before with your exact arrival time.   Make sure you have arranged ahead of time for a responsible adult (18 or older) to accompany and drive you home after the procedure.  Please discuss any transportation concerns with our staff prior to your procedure.    The effects of the anesthesia can persist for 24 hours.  After receiving the sedation, you must exercise caution before engaging in any activity that could harm yourself and others (such as driving a car).  Do not make any important decisions or do not drink any alcoholic beverages during this time period.  After your procedure, you may have anything you'd like to eat or drink.  You will probably want to start with something light.  Please include plenty of fluids.  Avoid items that cause gas such as sodas and salads.    SPECIAL INSTRUCTIONS:    For patients currently taking blood thinners and/or antiplatelet therapy our office will contact the prescribing provider.  Our office will contact you with any required changes to your medication regimen.     Blood thinner (i.e. - Coumadin, Pradaxa, Lovenox, Xarelto, Eliquis)  ?  Continue (Do Not Stop)  ? Stop______________for_____________days prior to the procedure.    Antiplatelet  (i.e. - Plavix, Aggrenox, Effient, Brilinta)  ?  Continue (Do Not Stop)  ? Stop______________for_____________days prior to the procedure.       Diabetes:   If you are Diabetic, please see separate Diabetic Instruction Sheet.          Prescribed medications:  Do not stop your aspirin, or any of your other medications (unless instructed otherwise).    Take the rest of your prescribed medications with small sips of water at least 2 hours prior to your procedure.      For any questions or concerns related to your bowel preparation or pre-procedure instructions, please contact our office at [phone].  Thank you for choosing [facility]!

## 2025-05-30 NOTE — TELEPHONE ENCOUNTER
Scheduled date of colonoscopy (as of today): 7/10/25  Physician performing colonoscopy: MARVA  Location of colonoscopy: [place]  Bowel prep reviewed with patient: CAMILLE / LISHA  Instructions reviewed with patient by: CLAUDIA  Clearances: N/A

## 2025-06-20 LAB
25(OH)D3 SERPL-MCNC: 35 NG/ML (ref 30–100)
CA-I SERPL-MCNC: 5.1 MG/DL (ref 4.7–5.5)
CALCIUM SERPL-MCNC: 9.2 MG/DL (ref 8.6–10.4)
PTH-INTACT SERPL-MCNC: 69 PG/ML (ref 16–77)

## 2025-06-30 ENCOUNTER — PROCEDURE VISIT (OUTPATIENT)
Dept: OBGYN CLINIC | Facility: CLINIC | Age: 55
End: 2025-06-30

## 2025-06-30 VITALS
HEIGHT: 67 IN | SYSTOLIC BLOOD PRESSURE: 118 MMHG | BODY MASS INDEX: 45.99 KG/M2 | DIASTOLIC BLOOD PRESSURE: 82 MMHG | WEIGHT: 293 LBS

## 2025-06-30 DIAGNOSIS — N84.1 ENDOCERVICAL POLYP: Primary | ICD-10-CM

## 2025-06-30 PROCEDURE — 88342 IMHCHEM/IMCYTCHM 1ST ANTB: CPT | Performed by: PATHOLOGY

## 2025-06-30 PROCEDURE — 88305 TISSUE EXAM BY PATHOLOGIST: CPT | Performed by: PATHOLOGY

## 2025-06-30 PROCEDURE — 88344 IMHCHEM/IMCYTCHM EA MLT ANTB: CPT | Performed by: PATHOLOGY

## 2025-06-30 NOTE — PROGRESS NOTES
Biopsy    Date/Time: 6/30/2025 3:00 PM    Performed by: Kita Hancock PA-C  Authorized by: Kita Hancock PA-C    Universal Protocol:  procedure performed by consultantConsent: Verbal consent obtained  Risks and benefits: risks, benefits and alternatives were discussed  Consent given by: patient  Patient understanding: patient states understanding of the procedure being performed  Patient identity confirmed: verbally with patient    Procedure Details - Lesion Biopsy:      Matthews speculum inserted into vagina  Cervix prepped with betadine  2 small polyp grasped with ring forceps and removed with mild twisting  No significant bleeding results post removal    - Reviewed risk for bleeding, infection, and/or incomplete removal of polyp  - Patient aware if entire polyp cannot be removed and result is precancerous or cancerous, then will need procedure in OR with attending physician.  - Polyp removed without incident in entirety.

## 2025-07-08 PROCEDURE — 88342 IMHCHEM/IMCYTCHM 1ST ANTB: CPT | Performed by: PATHOLOGY

## 2025-07-08 PROCEDURE — 88305 TISSUE EXAM BY PATHOLOGIST: CPT | Performed by: PATHOLOGY

## 2025-07-08 PROCEDURE — 88344 IMHCHEM/IMCYTCHM EA MLT ANTB: CPT | Performed by: PATHOLOGY

## 2025-07-18 ENCOUNTER — DOCUMENTATION (OUTPATIENT)
Dept: ADMINISTRATIVE | Facility: OTHER | Age: 55
End: 2025-07-18

## 2025-07-18 ENCOUNTER — TELEPHONE (OUTPATIENT)
Age: 55
End: 2025-07-18

## 2025-07-18 NOTE — TELEPHONE ENCOUNTER
Please remove PCP from chart    Pt transferred to [facility]    Last seen there 6/26/2025    Upcoming appt with them on 12/30/2025    Thank you

## 2025-07-18 NOTE — TELEPHONE ENCOUNTER
07/18/25 2:41 PM     The office's request has been received and reviewed.    The PCP has been successfully removed with a patient attribution note.     This message will now be completed.    Thank you  Lauren Hess MA

## (undated) DEVICE — INTENDED FOR TISSUE SEPARATION, AND OTHER PROCEDURES THAT REQUIRE A SHARP SURGICAL BLADE TO PUNCTURE OR CUT.: Brand: BARD-PARKER ® CARBON RIB-BACK BLADES

## (undated) DEVICE — GAUZE SPONGES,16 PLY: Brand: CURITY

## (undated) DEVICE — GLOVE SRG BIOGEL ECLIPSE 7

## (undated) DEVICE — SUT SILK 5-0 P-3 18 IN 640G

## (undated) DEVICE — ELECTRODE NEEDLE MEGAFINE 2IN E-Z CLEAN MEGADYNE -0118

## (undated) DEVICE — GLOVE INDICATOR PI UNDERGLOVE SZ 7 BLUE

## (undated) DEVICE — GLOVE SRG BIOGEL 7

## (undated) DEVICE — SUT PDS II 3-0 RB-1 27 IN Z305H

## (undated) DEVICE — PENCIL ELECTROSURG E-Z CLEAN -0035H

## (undated) DEVICE — NEEDLE 25G X 1 1/2

## (undated) DEVICE — BETHLEHEM UNIVERSAL MINOR GEN: Brand: CARDINAL HEALTH

## (undated) DEVICE — GLOVE SRG BIOGEL 7.5

## (undated) DEVICE — UNDYED MONOFILAMENT (POLYDIOXANONE), ABSORBABLE SURGICAL SUTURE: Brand: PDS

## (undated) DEVICE — SUT PROLENE 6-0 P-3 18 IN 8695G

## (undated) DEVICE — SCD SEQUENTIAL COMPRESSION COMFORT SLEEVE MEDIUM KNEE LENGTH: Brand: KENDALL SCD

## (undated) DEVICE — DRAPE SHEET THREE QUARTER